# Patient Record
Sex: FEMALE | Race: WHITE | NOT HISPANIC OR LATINO | Employment: FULL TIME | URBAN - METROPOLITAN AREA
[De-identification: names, ages, dates, MRNs, and addresses within clinical notes are randomized per-mention and may not be internally consistent; named-entity substitution may affect disease eponyms.]

---

## 2016-05-31 LAB
EXTERNAL ABO GROUPING: NORMAL
EXTERNAL ANTIBODY SCREEN: NEGATIVE
EXTERNAL RH FACTOR: POSITIVE
EXTERNAL RUBELLA QUALITATIVE: NORMAL
EXTERNAL SYPHILIS RPR SCREEN: NORMAL
EXTERNAL URINE DRUG SCREEN: NORMAL

## 2016-12-13 LAB — EXTERNAL GROUP B STREP ANTIGEN: NEGATIVE

## 2017-01-02 ENCOUNTER — HOSPITAL ENCOUNTER (INPATIENT)
Dept: LABOR AND DELIVERY | Facility: HOSPITAL | Age: 32
LOS: 3 days | Discharge: HOME | End: 2017-01-05
Attending: OBSTETRICS & GYNECOLOGY | Admitting: OBSTETRICS & GYNECOLOGY

## 2017-01-02 DIAGNOSIS — Z3A.39 39 WEEKS GESTATION OF PREGNANCY: ICD-10-CM

## 2017-01-02 LAB
ABO GROUP BLD: NORMAL
BLD GP AB SCN SERPL QL: NEGATIVE
DEPRECATED RDW RBC AUTO: 42.5 FL (ref 37–54)
ERYTHROCYTE [DISTWIDTH] IN BLOOD BY AUTOMATED COUNT: 12.9 % (ref 11.3–14.5)
EXTERNAL HEMATOCRIT: 40 %
EXTERNAL HEMOGLOBIN: 13.5 G/DL
HCT VFR BLD AUTO: 39.7 % (ref 34.5–44)
HGB BLD-MCNC: 13.5 G/DL (ref 11.5–15.5)
MCH RBC QN AUTO: 30.8 PG (ref 27–31)
MCHC RBC AUTO-ENTMCNC: 34 G/DL (ref 32–36)
MCV RBC AUTO: 90.4 FL (ref 80–99)
PLATELET # BLD AUTO: 167 10*3/MM3 (ref 150–450)
PMV BLD AUTO: 11.7 FL (ref 6–12)
RBC # BLD AUTO: 4.39 10*6/MM3 (ref 3.89–5.14)
RH BLD: POSITIVE
WBC NRBC COR # BLD: 8.61 10*3/MM3 (ref 3.5–10.8)

## 2017-01-02 PROCEDURE — 86901 BLOOD TYPING SEROLOGIC RH(D): CPT

## 2017-01-02 PROCEDURE — 86900 BLOOD TYPING SEROLOGIC ABO: CPT

## 2017-01-02 PROCEDURE — 86850 RBC ANTIBODY SCREEN: CPT

## 2017-01-02 PROCEDURE — 59025 FETAL NON-STRESS TEST: CPT

## 2017-01-02 PROCEDURE — 59200 INSERT CERVICAL DILATOR: CPT | Performed by: OBSTETRICS & GYNECOLOGY

## 2017-01-02 PROCEDURE — 85027 COMPLETE CBC AUTOMATED: CPT | Performed by: OBSTETRICS & GYNECOLOGY

## 2017-01-02 RX ORDER — LIDOCAINE HYDROCHLORIDE 10 MG/ML
5 INJECTION, SOLUTION INFILTRATION; PERINEURAL AS NEEDED
Status: DISCONTINUED | OUTPATIENT
Start: 2017-01-02 | End: 2017-01-03 | Stop reason: HOSPADM

## 2017-01-02 RX ORDER — SODIUM CHLORIDE, SODIUM LACTATE, POTASSIUM CHLORIDE, CALCIUM CHLORIDE 600; 310; 30; 20 MG/100ML; MG/100ML; MG/100ML; MG/100ML
125 INJECTION, SOLUTION INTRAVENOUS CONTINUOUS
Status: DISCONTINUED | OUTPATIENT
Start: 2017-01-02 | End: 2017-01-03

## 2017-01-02 RX ORDER — FAMOTIDINE 20 MG/1
20 TABLET, FILM COATED ORAL 2 TIMES DAILY
Status: DISCONTINUED | OUTPATIENT
Start: 2017-01-02 | End: 2017-01-03 | Stop reason: HOSPADM

## 2017-01-02 RX ORDER — ZOLPIDEM TARTRATE 5 MG/1
5 TABLET ORAL NIGHTLY PRN
Status: DISCONTINUED | OUTPATIENT
Start: 2017-01-02 | End: 2017-01-03 | Stop reason: HOSPADM

## 2017-01-02 RX ORDER — ACETAMINOPHEN 325 MG/1
650 TABLET ORAL EVERY 4 HOURS PRN
Status: DISCONTINUED | OUTPATIENT
Start: 2017-01-02 | End: 2017-01-03 | Stop reason: HOSPADM

## 2017-01-02 RX ORDER — FAMOTIDINE 10 MG/ML
20 INJECTION, SOLUTION INTRAVENOUS 2 TIMES DAILY
Status: DISCONTINUED | OUTPATIENT
Start: 2017-01-02 | End: 2017-01-03 | Stop reason: HOSPADM

## 2017-01-02 RX ORDER — TERBUTALINE SULFATE 1 MG/ML
0.25 INJECTION, SOLUTION SUBCUTANEOUS AS NEEDED
Status: DISCONTINUED | OUTPATIENT
Start: 2017-01-02 | End: 2017-01-03 | Stop reason: HOSPADM

## 2017-01-02 RX ORDER — PROMETHAZINE HYDROCHLORIDE 12.5 MG/1
12.5 TABLET ORAL EVERY 6 HOURS PRN
Status: DISCONTINUED | OUTPATIENT
Start: 2017-01-02 | End: 2017-01-03 | Stop reason: HOSPADM

## 2017-01-02 RX ORDER — PROMETHAZINE HYDROCHLORIDE 25 MG/ML
12.5 INJECTION, SOLUTION INTRAMUSCULAR; INTRAVENOUS EVERY 6 HOURS PRN
Status: DISCONTINUED | OUTPATIENT
Start: 2017-01-02 | End: 2017-01-03 | Stop reason: HOSPADM

## 2017-01-02 RX ORDER — SODIUM CHLORIDE 0.9 % (FLUSH) 0.9 %
1-10 SYRINGE (ML) INJECTION AS NEEDED
Status: DISCONTINUED | OUTPATIENT
Start: 2017-01-02 | End: 2017-01-03 | Stop reason: HOSPADM

## 2017-01-02 RX ORDER — HEPARIN SODIUM 10000 [USP'U]/ML
10000 INJECTION, SOLUTION INTRAVENOUS; SUBCUTANEOUS 2 TIMES DAILY
Refills: 0 | COMMUNITY
Start: 2016-12-14 | End: 2017-01-05 | Stop reason: HOSPADM

## 2017-01-02 RX ORDER — OXYTOCIN/RINGER'S LACTATE 30/500 ML
2-24 PLASTIC BAG, INJECTION (ML) INTRAVENOUS
Status: DISCONTINUED | OUTPATIENT
Start: 2017-01-02 | End: 2017-01-02

## 2017-01-02 RX ORDER — OXYTOCIN/RINGER'S LACTATE 30/500 ML
2-24 PLASTIC BAG, INJECTION (ML) INTRAVENOUS
Status: DISCONTINUED | OUTPATIENT
Start: 2017-01-02 | End: 2017-01-03 | Stop reason: HOSPADM

## 2017-01-02 RX ORDER — PROMETHAZINE HYDROCHLORIDE 12.5 MG/1
12.5 SUPPOSITORY RECTAL EVERY 6 HOURS PRN
Status: DISCONTINUED | OUTPATIENT
Start: 2017-01-02 | End: 2017-01-03 | Stop reason: HOSPADM

## 2017-01-02 RX ADMIN — SODIUM CHLORIDE, POTASSIUM CHLORIDE, SODIUM LACTATE AND CALCIUM CHLORIDE 125 ML/HR: 600; 310; 30; 20 INJECTION, SOLUTION INTRAVENOUS at 18:38

## 2017-01-02 RX ADMIN — Medication 2 MILLI-UNITS/MIN: at 21:14

## 2017-01-02 NOTE — IP AVS SNAPSHOT
AFTER VISIT SUMMARY             Lauren Jones           About your hospitalization     You were admitted on:  January 2, 2017 You last received care in the:  [facility]       Procedures & Surgeries         Medications    If you or your caregiver advised us that you are currently taking a medication and that medication is marked below as “Resume”, this simply indicates that we have reviewed those medications to make sure our new therapy recommendations do not interfere.  If you have concerns about medications other than those new ones which we are prescribing today, please consult the physician who prescribed them (or your primary physician).  Our review of your home medications is not meant to indicate that we are directing their use.             Your Medications      START taking these medications     HYDROcodone-acetaminophen 5-325 MG per tablet   Take 2 tablets by mouth Every 4 (Four) Hours As Needed for moderate pain (4-6) for up to 9 days.   Last time this was given:  1/5/2017 12:37 AM   Commonly known as:  NORCO             CONTINUE taking these medications     docusate sodium 100 MG capsule   Take 200 mg by mouth 2 (two) times a day.   Last time this was given:  1/4/2017  6:45 PM   Commonly known as:  COLACE           enoxaparin 40 MG/0.4ML solution syringe   Inject 40 mg under the skin Every 12 (Twelve) Hours. Taking once daily.   Last time this was given:  1/4/2017  8:20 PM   Commonly known as:  LOVENOX           Magnesium 200 MG tablet   Take 1 tablet by mouth Daily.           polyethylene glycol packet   Take 17 g by mouth daily.   Commonly known as:  MIRALAX           prenatal (CLASSIC) vitamin 28-0.8 MG tablet tablet   Take  by mouth daily.             STOP taking these medications     heparin (porcine) 41717 UNIT/ML injection                Where to Get Your Medications      You can get these medications from any pharmacy     Bring a paper prescription for each of  these medications     HYDROcodone-acetaminophen 5-325 MG per tablet                  Your Medications      Your Medication List           Morning Noon Evening Bedtime As Needed    docusate sodium 100 MG capsule   Take 200 mg by mouth 2 (two) times a day.   Commonly known as:  COLACE                                enoxaparin 40 MG/0.4ML solution syringe   Inject 40 mg under the skin Every 12 (Twelve) Hours. Taking once daily.   Commonly known as:  LOVENOX                                HYDROcodone-acetaminophen 5-325 MG per tablet   Take 2 tablets by mouth Every 4 (Four) Hours As Needed for moderate pain (4-6) for up to 9 days.   Commonly known as:  NORCO                                Magnesium 200 MG tablet   Take 1 tablet by mouth Daily.                                polyethylene glycol packet   Take 17 g by mouth daily.   Commonly known as:  MIRALAX                                prenatal (CLASSIC) vitamin 28-0.8 MG tablet tablet   Take  by mouth daily.                                         Instructions for After Discharge         Follow-ups for After Discharge        Follow-up Information     Follow up with Vidhi Nicolas MD. Call today.    Specialties:  Obstetrics and Gynecology, Gynecology    Contact information:    [address] [address]  [phone]        [employer] Signup     [facility] [employer] allows you to send messages to your doctor, view your test results, renew your prescriptions, schedule appointments, and more. To sign up, go to Teamly and click on the Sign Up Now link in the New User? box. Enter your [employer] Activation Code exactly as it appears below along with the last four digits of your Social Security Number and your Date of Birth () to complete the sign-up process. If you do not sign up before the expiration date, you must request a new code.    [employer] Activation Code: [email or web address]  Expires: 2017  9:39 AM    If you have  questions, you can email [email or web address] or call [phone] to talk to our [employer] staff. Remember, [employer] is NOT to be used for urgent needs. For medical emergencies, dial 911.           Summary of Your Hospitalization        Reason for Hospitalization     Your primary diagnosis was:  Not on File    Your diagnoses also included:  Pregnancy, Pregnancy      Care Providers     Provider Service Role Specialty    Vidhi Nicolas MD -- Attending Provider Obstetrics and Gynecology      Your Allergies  Date Reviewed: 1/3/2017    No active allergies      Patient Belongings Returned     Document Return of Belongings Flowsheet     Were the patient bedside belongings sent home?   --   Belongings Retrieved from Security & Sent Home   --    Belongings Sent to Safe   --   Medications Retrieved from Pharmacy & Sent Home   --               SYMPTOMS OF A STROKE    Call 911 or have someone take you to the Emergency Department if you have any of the following:    · Sudden numbness or weakness of your face, arm or leg especially on one side of the body  · Sudden confusion, diffiiculty speaking or trouble understanding   · Changes in your vision or loss of sight in one eye  · Sudden severe headache with no known cause  · sudden dizziness, trouble walking, loss of balance or coordination    It is important to seek emergency care right away if you have further stroke symptoms. If you get emergency help quickly, the powerful clot-dissolving medicines can reduce the disabilities caused by a stroke.     For more information:    American Stroke Association  5-753-5-STROKE  www.strokeassociation.org           IF YOU SMOKE OR USE TOBACCO PLEASE READ THE FOLLOWING:    Why is smoking bad for me?  Smoking increases the risk of heart disease, lung disease, vascular disease, stroke, and cancer.     If you smoke, STOP!    If you would like more information on quitting smoking, please visit the [employer] website:  [email or web address]   This link will provide additional resources including the QUIT line and the Beat the Pack support groups.     For more information:    American Cancer Society  (676) 305-8768    American Heart Association  1-612.662.7981               YOU ARE THE MOST IMPORTANT FACTOR IN YOUR RECOVERY.     Follow all instructions carefully.     I have reviewed my discharge instructions with my nurse, including the following information, if applicable:     Information about my illness and diagnosis   Follow up appointments (including lab draws)   Wound Care   Equipment Needs   Medications (new and continuing) along with side effects   Preventative information such as vaccines and smoking cessations   Diet   Pain   I know when to contact my Doctor's office or seek emergency care      I want my nurse to describe the side effects of my medications: YES NO   If the answer is no, I understand the side effects of my medications: YES NO   My nurse described the side effects of my medications in a way that I could understand: YES NO   I have taken my personal belongings and my own medications with me at discharge: YES NO            I have received this information and my questions have been answered. I have discussed any concerns I see with this plan with the nurse or physician. I understand these instructions.    Signature of Patient or Responsible Person: _____________________________________    Date: _________________  Time: __________________    Signature of Healthcare Provider: _______________________________________  Date: _________________  Time: __________________

## 2017-01-02 NOTE — IP AVS SNAPSHOT
AFTER VISIT SUMMARY             Lauren Jones           About your hospitalization     You were discharged on:  January 5, 2017 You last received care in the:  [facility]     Unit phone number:  [phone]       Medications    If you or your caregiver advised us that you are currently taking a medication and that medication is marked below as “Resume”, this simply indicates that we have reviewed those medications to make sure our new therapy recommendations do not interfere.  If you have concerns about medications other than those new ones which we are prescribing today, please consult the physician who prescribed them (or your primary physician).  Our review of your home medications is not meant to indicate that we are directing their use.             Your Medications      Your Medication List           Morning Noon Evening Bedtime As Needed    docusate sodium 100 MG capsule   Take 200 mg by mouth 2 (two) times a day.   Commonly known as:  COLACE                                enoxaparin 40 MG/0.4ML solution syringe   Inject 40 mg under the skin Every 12 (Twelve) Hours. Taking once daily.   Commonly known as:  LOVENOX                                HYDROcodone-acetaminophen 5-325 MG per tablet   Take 2 tablets by mouth Every 4 (Four) Hours As Needed for moderate pain (4-6) for up to 9 days.   Commonly known as:  NORCO                                Magnesium 200 MG tablet   Take 1 tablet by mouth Daily.                                polyethylene glycol packet   Take 17 g by mouth daily.   Commonly known as:  MIRALAX                                prenatal (CLASSIC) vitamin 28-0.8 MG tablet tablet   Take  by mouth daily.                                         Instructions for After Discharge         Follow-ups for After Discharge        [facility] Signup Information     [facility] [employer] allows you to send messages to your doctor, view your test results, renew your  prescriptions, schedule appointments, and more. To sign up, go to [employer] and click on the Sign Up Now link in the New User? box. Enter your [employer] Activation Code exactly as it appears below along with the last four digits of your Social Security Number and your Date of Birth () to complete the sign-up process. If you do not sign up before the expiration date, you must request a new code.    [employer] Activation Code: [email or web address]  Expires: 2017  9:39 AM    If you have questions, you can email [email or web address] or call [phone] to talk to our [employer] staff. Remember, [employer] is NOT to be used for urgent needs. For medical emergencies, dial 911.           Summary of Your Hospitalization        Reason for Hospitalization     Your primary diagnosis was:  Not on File    Your diagnoses also included:  Pregnancy, Pregnancy      Care Providers     Provider Service Role Specialty    Vidhi Nicolas MD -- Attending Provider Obstetrics and Gynecology      Your Allergies  Date Reviewed: 1/3/2017    No active allergies         SYMPTOMS OF A STROKE    Call 911 or have someone take you to the Emergency Department if you have any of the following:    · Sudden numbness or weakness of your face, arm or leg especially on one side of the body  · Sudden confusion, diffiiculty speaking or trouble understanding   · Changes in your vision or loss of sight in one eye  · Sudden severe headache with no known cause  · sudden dizziness, trouble walking, loss of balance or coordination    It is important to seek emergency care right away if you have further stroke symptoms. If you get emergency help quickly, the powerful clot-dissolving medicines can reduce the disabilities caused by a stroke.     For more information:    American Stroke Association  0-119-6-STROKE  www.strokeassociation.org           IF YOU SMOKE OR USE TOBACCO PLEASE READ THE FOLLOWING:    Why is smoking bad for me?  Smoking  increases the risk of heart disease, lung disease, vascular disease, stroke, and cancer.     If you smoke, STOP!    If you would like more information on quitting smoking, please visit the [employer] website: [email or web address]-[email or web address]   This link will provide additional resources including the QUIT line and the Beat the Pack support groups.     For more information:    American Cancer Society  ([phone] 670-6869    American Heart Association  1-993.314.4676               YOU ARE THE MOST IMPORTANT FACTOR IN YOUR RECOVERY.     Follow all instructions carefully.     I have reviewed my discharge instructions with my nurse, including the following information, if applicable:     Information about my illness and diagnosis   Follow up appointments (including lab draws)   Wound Care   Equipment Needs   Medications (new and continuing) along with side effects   Preventative information such as vaccines and smoking cessations   Diet   Pain   I know when to contact my Doctor's office or seek emergency care      I want my nurse to describe the side effects of my medications: YES NO   If the answer is no, I understand the side effects of my medications: YES NO   My nurse described the side effects of my medications in a way that I could understand: YES NO   I have taken my personal belongings and my own medications with me at discharge: YES NO            Should a home visit be schedule with you:  a notification from your provider will be made prior to the visit.  If you have any questions or concerns about the visit, contact your provider.      I have received this information and my questions have been answered. I have discussed any concerns I see with this plan with the nurse or physician. I understand these instructions.    Signature of Patient or Responsible Person: _____________________________________    Date: _________________  Time: __________________    Signature of Healthcare  Provider: _______________________________________  Date: _________________  Time: __________________

## 2017-01-03 ENCOUNTER — ANESTHESIA (OUTPATIENT)
Dept: LABOR AND DELIVERY | Facility: HOSPITAL | Age: 32
End: 2017-01-03

## 2017-01-03 ENCOUNTER — ANESTHESIA EVENT (OUTPATIENT)
Dept: LABOR AND DELIVERY | Facility: HOSPITAL | Age: 32
End: 2017-01-03

## 2017-01-03 PROBLEM — Z34.90 CURRENTLY PREGNANT: Status: ACTIVE | Noted: 2017-01-03

## 2017-01-03 PROCEDURE — 25010000002 FENTANYL CITRATE (PF) 100 MCG/2ML SOLUTION: Performed by: ANESTHESIOLOGY

## 2017-01-03 PROCEDURE — 25010000002 ROPIVACAINE PER 1 MG: Performed by: ANESTHESIOLOGY

## 2017-01-03 PROCEDURE — 0KQM0ZZ REPAIR PERINEUM MUSCLE, OPEN APPROACH: ICD-10-PCS | Performed by: OBSTETRICS & GYNECOLOGY

## 2017-01-03 PROCEDURE — 25010000002 GENTAMICIN PER 80 MG

## 2017-01-03 PROCEDURE — 25010000002 ONDANSETRON PER 1 MG: Performed by: ANESTHESIOLOGY

## 2017-01-03 PROCEDURE — 25010000002 BUTORPHANOL PER 1 MG: Performed by: OBSTETRICS & GYNECOLOGY

## 2017-01-03 PROCEDURE — 25010000002 ENOXAPARIN PER 10 MG: Performed by: OBSTETRICS & GYNECOLOGY

## 2017-01-03 PROCEDURE — 59025 FETAL NON-STRESS TEST: CPT

## 2017-01-03 RX ORDER — METHYLERGONOVINE MALEATE 0.2 MG/ML
200 INJECTION INTRAVENOUS AS NEEDED
Status: DISCONTINUED | OUTPATIENT
Start: 2017-01-03 | End: 2017-01-03 | Stop reason: HOSPADM

## 2017-01-03 RX ORDER — FENTANYL CITRATE 50 UG/ML
INJECTION, SOLUTION INTRAMUSCULAR; INTRAVENOUS AS NEEDED
Status: DISCONTINUED | OUTPATIENT
Start: 2017-01-03 | End: 2017-01-17 | Stop reason: SURG

## 2017-01-03 RX ORDER — CARBOPROST TROMETHAMINE 250 UG/ML
250 INJECTION, SOLUTION INTRAMUSCULAR AS NEEDED
Status: DISCONTINUED | OUTPATIENT
Start: 2017-01-03 | End: 2017-01-03 | Stop reason: HOSPADM

## 2017-01-03 RX ORDER — PROMETHAZINE HYDROCHLORIDE 25 MG/ML
12.5 INJECTION, SOLUTION INTRAMUSCULAR; INTRAVENOUS EVERY 6 HOURS PRN
Status: DISCONTINUED | OUTPATIENT
Start: 2017-01-03 | End: 2017-01-05 | Stop reason: HOSPADM

## 2017-01-03 RX ORDER — OXYCODONE AND ACETAMINOPHEN 7.5; 325 MG/1; MG/1
2 TABLET ORAL EVERY 4 HOURS PRN
Status: DISCONTINUED | OUTPATIENT
Start: 2017-01-03 | End: 2017-01-03 | Stop reason: HOSPADM

## 2017-01-03 RX ORDER — LIDOCAINE HYDROCHLORIDE AND EPINEPHRINE 15; 5 MG/ML; UG/ML
INJECTION, SOLUTION EPIDURAL AS NEEDED
Status: DISCONTINUED | OUTPATIENT
Start: 2017-01-03 | End: 2017-01-17 | Stop reason: SURG

## 2017-01-03 RX ORDER — BISACODYL 10 MG
10 SUPPOSITORY, RECTAL RECTAL DAILY PRN
Status: DISCONTINUED | OUTPATIENT
Start: 2017-01-04 | End: 2017-01-05 | Stop reason: HOSPADM

## 2017-01-03 RX ORDER — OXYTOCIN/RINGER'S LACTATE 20/1000 ML
125 PLASTIC BAG, INJECTION (ML) INTRAVENOUS CONTINUOUS PRN
Status: DISCONTINUED | OUTPATIENT
Start: 2017-01-03 | End: 2017-01-05 | Stop reason: HOSPADM

## 2017-01-03 RX ORDER — MISOPROSTOL 200 UG/1
800 TABLET ORAL AS NEEDED
Status: DISCONTINUED | OUTPATIENT
Start: 2017-01-03 | End: 2017-01-03 | Stop reason: HOSPADM

## 2017-01-03 RX ORDER — SODIUM CHLORIDE 0.9 % (FLUSH) 0.9 %
1-10 SYRINGE (ML) INJECTION AS NEEDED
Status: DISCONTINUED | OUTPATIENT
Start: 2017-01-03 | End: 2017-01-05 | Stop reason: HOSPADM

## 2017-01-03 RX ORDER — ONDANSETRON 2 MG/ML
4 INJECTION INTRAMUSCULAR; INTRAVENOUS ONCE AS NEEDED
Status: COMPLETED | OUTPATIENT
Start: 2017-01-03 | End: 2017-01-03

## 2017-01-03 RX ORDER — ZOLPIDEM TARTRATE 5 MG/1
5 TABLET ORAL NIGHTLY PRN
Status: DISCONTINUED | OUTPATIENT
Start: 2017-01-03 | End: 2017-01-05 | Stop reason: HOSPADM

## 2017-01-03 RX ORDER — FAMOTIDINE 10 MG/ML
20 INJECTION, SOLUTION INTRAVENOUS ONCE AS NEEDED
Status: DISCONTINUED | OUTPATIENT
Start: 2017-01-03 | End: 2017-01-03 | Stop reason: HOSPADM

## 2017-01-03 RX ORDER — PROMETHAZINE HYDROCHLORIDE 12.5 MG/1
12.5 TABLET ORAL EVERY 6 HOURS PRN
Status: DISCONTINUED | OUTPATIENT
Start: 2017-01-03 | End: 2017-01-03 | Stop reason: HOSPADM

## 2017-01-03 RX ORDER — PROMETHAZINE HYDROCHLORIDE 25 MG/1
25 TABLET ORAL EVERY 6 HOURS PRN
Status: DISCONTINUED | OUTPATIENT
Start: 2017-01-03 | End: 2017-01-05 | Stop reason: HOSPADM

## 2017-01-03 RX ORDER — GENTAMICIN SULFATE 60 MG/50ML
2 INJECTION, SOLUTION INTRAVENOUS ONCE
Status: DISCONTINUED | OUTPATIENT
Start: 2017-01-03 | End: 2017-01-03

## 2017-01-03 RX ORDER — ACETAMINOPHEN 325 MG/1
650 TABLET ORAL EVERY 4 HOURS PRN
Status: DISCONTINUED | OUTPATIENT
Start: 2017-01-03 | End: 2017-01-05 | Stop reason: HOSPADM

## 2017-01-03 RX ORDER — OXYTOCIN/RINGER'S LACTATE 20/1000 ML
999 PLASTIC BAG, INJECTION (ML) INTRAVENOUS CONTINUOUS PRN
Status: DISCONTINUED | OUTPATIENT
Start: 2017-01-03 | End: 2017-01-03

## 2017-01-03 RX ORDER — LANOLIN 100 %
OINTMENT (GRAM) TOPICAL
Status: DISCONTINUED | OUTPATIENT
Start: 2017-01-03 | End: 2017-01-05 | Stop reason: HOSPADM

## 2017-01-03 RX ORDER — METOCLOPRAMIDE HYDROCHLORIDE 5 MG/ML
10 INJECTION INTRAMUSCULAR; INTRAVENOUS ONCE AS NEEDED
Status: DISCONTINUED | OUTPATIENT
Start: 2017-01-03 | End: 2017-01-03 | Stop reason: HOSPADM

## 2017-01-03 RX ORDER — PROMETHAZINE HYDROCHLORIDE 25 MG/ML
12.5 INJECTION, SOLUTION INTRAMUSCULAR; INTRAVENOUS EVERY 6 HOURS PRN
Status: DISCONTINUED | OUTPATIENT
Start: 2017-01-03 | End: 2017-01-03 | Stop reason: HOSPADM

## 2017-01-03 RX ORDER — ACETAMINOPHEN 500 MG
500 TABLET ORAL ONCE
Status: DISCONTINUED | OUTPATIENT
Start: 2017-01-03 | End: 2017-01-03

## 2017-01-03 RX ORDER — ACETAMINOPHEN 500 MG
1000 TABLET ORAL ONCE
Status: COMPLETED | OUTPATIENT
Start: 2017-01-03 | End: 2017-01-03

## 2017-01-03 RX ORDER — PROMETHAZINE HYDROCHLORIDE 12.5 MG/1
12.5 SUPPOSITORY RECTAL EVERY 6 HOURS PRN
Status: DISCONTINUED | OUTPATIENT
Start: 2017-01-03 | End: 2017-01-05 | Stop reason: HOSPADM

## 2017-01-03 RX ORDER — PROMETHAZINE HYDROCHLORIDE 12.5 MG/1
12.5 SUPPOSITORY RECTAL EVERY 6 HOURS PRN
Status: DISCONTINUED | OUTPATIENT
Start: 2017-01-03 | End: 2017-01-03 | Stop reason: HOSPADM

## 2017-01-03 RX ORDER — OXYCODONE HYDROCHLORIDE AND ACETAMINOPHEN 5; 325 MG/1; MG/1
1 TABLET ORAL EVERY 4 HOURS PRN
Status: DISCONTINUED | OUTPATIENT
Start: 2017-01-03 | End: 2017-01-03 | Stop reason: HOSPADM

## 2017-01-03 RX ORDER — DOCUSATE SODIUM 100 MG/1
100 CAPSULE, LIQUID FILLED ORAL 2 TIMES DAILY
Status: DISCONTINUED | OUTPATIENT
Start: 2017-01-03 | End: 2017-01-05 | Stop reason: HOSPADM

## 2017-01-03 RX ORDER — DIPHENHYDRAMINE HYDROCHLORIDE 50 MG/ML
12.5 INJECTION INTRAMUSCULAR; INTRAVENOUS EVERY 8 HOURS PRN
Status: DISCONTINUED | OUTPATIENT
Start: 2017-01-03 | End: 2017-01-03 | Stop reason: HOSPADM

## 2017-01-03 RX ORDER — ACETAMINOPHEN 325 MG/1
650 TABLET ORAL EVERY 4 HOURS PRN
Status: DISCONTINUED | OUTPATIENT
Start: 2017-01-03 | End: 2017-01-03 | Stop reason: HOSPADM

## 2017-01-03 RX ADMIN — ENOXAPARIN SODIUM 40 MG: 40 INJECTION SUBCUTANEOUS at 19:44

## 2017-01-03 RX ADMIN — ACETAMINOPHEN 650 MG: 325 TABLET, FILM COATED ORAL at 21:57

## 2017-01-03 RX ADMIN — SODIUM CHLORIDE, POTASSIUM CHLORIDE, SODIUM LACTATE AND CALCIUM CHLORIDE 125 ML/HR: 600; 310; 30; 20 INJECTION, SOLUTION INTRAVENOUS at 06:05

## 2017-01-03 RX ADMIN — GENTAMICIN SULFATE 110 MG: 40 INJECTION, SOLUTION INTRAMUSCULAR; INTRAVENOUS at 23:00

## 2017-01-03 RX ADMIN — ROPIVACAINE HYDROCHLORIDE 17 ML/HR: 5 INJECTION, SOLUTION EPIDURAL; INFILTRATION; PERINEURAL at 06:48

## 2017-01-03 RX ADMIN — FENTANYL CITRATE 100 MCG: 50 INJECTION, SOLUTION INTRAMUSCULAR; INTRAVENOUS at 06:48

## 2017-01-03 RX ADMIN — BUTORPHANOL TARTRATE 2 MG: 2 INJECTION, SOLUTION INTRAMUSCULAR; INTRAVENOUS at 04:26

## 2017-01-03 RX ADMIN — AMPICILLIN SODIUM 2 G: 2 INJECTION, POWDER, FOR SOLUTION INTRAMUSCULAR; INTRAVENOUS at 19:44

## 2017-01-03 RX ADMIN — WITCH HAZEL 1 PAD: 500 SOLUTION RECTAL; TOPICAL at 18:08

## 2017-01-03 RX ADMIN — SODIUM CHLORIDE, POTASSIUM CHLORIDE, SODIUM LACTATE AND CALCIUM CHLORIDE 125 ML/HR: 600; 310; 30; 20 INJECTION, SOLUTION INTRAVENOUS at 07:10

## 2017-01-03 RX ADMIN — SODIUM CHLORIDE, POTASSIUM CHLORIDE, SODIUM LACTATE AND CALCIUM CHLORIDE 125 ML/HR: 600; 310; 30; 20 INJECTION, SOLUTION INTRAVENOUS at 04:27

## 2017-01-03 RX ADMIN — GENTAMICIN SULFATE 140 MG: 40 INJECTION, SOLUTION INTRAMUSCULAR; INTRAVENOUS at 14:33

## 2017-01-03 RX ADMIN — Medication 125 ML/HR: at 14:30

## 2017-01-03 RX ADMIN — ROPIVACAINE HYDROCHLORIDE 10 ML: 5 INJECTION, SOLUTION EPIDURAL; INFILTRATION; PERINEURAL at 06:39

## 2017-01-03 RX ADMIN — SODIUM CHLORIDE, POTASSIUM CHLORIDE, SODIUM LACTATE AND CALCIUM CHLORIDE 125 ML/HR: 600; 310; 30; 20 INJECTION, SOLUTION INTRAVENOUS at 03:47

## 2017-01-03 RX ADMIN — ONDANSETRON 4 MG: 2 INJECTION INTRAMUSCULAR; INTRAVENOUS at 14:43

## 2017-01-03 RX ADMIN — ACETAMINOPHEN 1000 MG: 500 TABLET ORAL at 13:03

## 2017-01-03 RX ADMIN — LIDOCAINE HYDROCHLORIDE AND EPINEPHRINE 5 ML: 15; 5 INJECTION, SOLUTION EPIDURAL at 06:43

## 2017-01-03 RX ADMIN — DOCUSATE SODIUM 100 MG: 100 CAPSULE, LIQUID FILLED ORAL at 18:05

## 2017-01-03 RX ADMIN — ROPIVACAINE HYDROCHLORIDE 4 ML: 5 INJECTION, SOLUTION EPIDURAL; INFILTRATION; PERINEURAL at 06:44

## 2017-01-03 RX ADMIN — AMPICILLIN SODIUM 2 G: 2 INJECTION, POWDER, FOR SOLUTION INTRAMUSCULAR; INTRAVENOUS at 13:05

## 2017-01-03 RX ADMIN — Medication 999 ML/HR: at 13:29

## 2017-01-03 NOTE — PROCEDURES
Transcervical means placed per physician request.  FHT's class 1.  Patient tolerated well. 24 [place]raini, 60ml.    Santiago Begum MD  1/2/2017  8:37 PM

## 2017-01-03 NOTE — PLAN OF CARE
Problem: Patient Care Overview (Adult)  Goal: Plan of Care Review  Outcome: Ongoing (interventions implemented as appropriate)    01/03/17 0839   Coping/Psychosocial Response Interventions   Plan Of Care Reviewed With patient;spouse   Patient Care Overview   Progress progress toward functional goals as expected       Goal: Adult Individualization and Mutuality  Outcome: Ongoing (interventions implemented as appropriate)    01/03/17 0839   Individualization   Patient Specific Goals Have a healthy mother and baby       Goal: Discharge Needs Assessment  Outcome: Ongoing (interventions implemented as appropriate)    01/03/17 0839   Discharge Needs Assessment   Concerns To Be Addressed no discharge needs identified   Readmission Within The Last 30 Days no previous admission in last 30 days   Equipment Needed After Discharge none   Discharge Disposition home or self-care   Current Health   Anticipated Changes Related to Illness none   Self-Care   Equipment Currently Used at Home none   Living Environment   Transportation Available car         Problem: Labor (Cervical Ripen, Induct, Augment) (Adult,Obstetrics,Pediatric)  Intervention: Position/Reposition to Promote Fetal Well Being/Optimize Contraction Pattern    01/03/17 0839   Positioning   Positioning semi-Fowlers;side lying, left   Maternal/Fetal Interventions   Activity In Labor bedrest       Intervention: Assess for/Assist with Variations in Fetal Presentation    01/03/17 0839   Positioning   Positioning semi-Fowlers;side lying, left   Maternal/Fetal Interventions   Labor Interventions fetal heart rate monitored       Intervention: Monitor/Manage Labor Pain    01/03/17 0839   Manage Acute Burn Pain   Pain Management Interventions pain care plan reviewed with patient/caregiver;pillow support;medicated       Intervention: Provide Emotional Support and Encouragement    01/03/17 0839   Coping Strategies   Trust Relationship/Rapport care explained;choices  provided;emotional support provided;empathic listening provided;questions answered;questions encouraged;reassurance provided;thoughts/feelings acknowledged         Goal: Signs and Symptoms of Listed Potential Problems Will be Absent or Manageable (Labor)  Outcome: Ongoing (interventions implemented as appropriate)    01/03/17 0839   Labor (Cervical Ripen, Induct, Augment)   Problems Present (Labor) none

## 2017-01-03 NOTE — L&D DELIVERY NOTE
1/3/2017    Patient:Lauren Jones    MR#:[id number]    Vaginal Delivery Note  31 y.o. yo female  at 39w0d    Patient Active Problem List   Diagnosis   • Hx pulmonary embolism   • Pregnancy       Delivery     Delivery:       YOB: 2018    Time of Birth: 1:25 PM      Anesthesia: Epidural     Delivering clinician: Vidhi Nicolas    Forceps?   No   Vacuum? Yes  Vacuum Delivery  Vacuum attempted?       Vacuum indication:      Vacuum type:      Application location:      First Attempt     Time applied:      Time removed:      Second Attempt    Time applied:      Time removed:      Third Attempt    Time applied:      Time removed:      Number of pulls:      Number of pop-offs:      Low-end pressure range:      High-end pressure range:      Total application time:      Applied by:      Failed?          Shoulder dystocia present: No     Vacuum placed secondary to delayed second stage, maternal exhaustion, as well as maternal temperature and need for rapid delivery. Pulled with one contraction, 3 pulls, and baby was . Vacuum released, epis cut, one more pull and baby delivered. drt present.      Infant    Findings: female  infant     Infant observations: Weight: 7 lb 12.9 oz (3540 g)     Observations/Comments:         Apgars: 8   @ 1 minute /    9   @ 5 minutes         Placenta, Cord, and Fluid    Placenta delivered     at  1/3  1:29 PM     Cord:    present.   Nuchal Cord?  yes; Number of nuchal loops present: 1      Cord blood obtained:      Cord gases obtained:       Cord gas results: Pending         Repair    Episiotomy: Yes    Lacerations: Yes  Laceration Information  Laceration Repaired?   Perineal:      2nd degree   Periurethral:         Labial:         Sulcus:      bilateral   Vaginal:         Cervical:            Estimated Blood Loss:    400mls.   Suture used for repair: 2-0 Vicryl      Complications  maternal temperature    Disposition  Mother to Mother Baby/Postpartum  in stable  condition currently.  Baby to remains with mom  in stable condition currently.                Vidhi Nicolas MD  01/03/17  1:54 PM

## 2017-01-03 NOTE — ANESTHESIA PROCEDURE NOTES
Labor Epidural    Patient location during procedure: OB  Performed By  Anesthesiologist: SOWMYA RAMOS  Preanesthetic Checklist  Completed: patient identified, surgical consent, pre-op evaluation, timeout performed, IV checked, risks and benefits discussed and monitors and equipment checked  Epidural Block Prep:  Pt Position:sitting  Sterile Tech:cap, gloves, mask and sterile barrier  Monitoring:blood pressure monitoring  Epidural Block Procedure:  Approach:midline  Location:L3-L4  Needle Type:Tuohy  Needle Gauge:17 G  Cath Depth at skin:8 cm  Paresthesia: none  Aspiration:negative  Test Dose:negative  Post Assessment:  Dressing:occlusive dressing applied  Pt Tolerance:patient tolerated the procedure well with no apparent complications  Complications:no

## 2017-01-03 NOTE — PROGRESS NOTES
Patient pushing now approx hour and a half, temp 102.5. Will given po tylenol and start amp/gent.

## 2017-01-03 NOTE — ANESTHESIA PREPROCEDURE EVALUATION
Anesthesia Evaluation     Patient summary reviewed    No history of anesthetic complications   Airway   Mallampati: II  Neck ROM: full  anterior and no difficulty expected  Dental - normal exam     Pulmonary - normal exam   (+) pulmonary embolism,   (-) asthma, shortness of breath, not a smoker  Cardiovascular - negative cardio ROS and normal exam    Neuro/Psych  (+) headaches, psychiatric history,    (-) seizures, neuromuscular disease, TIA, CVA, dizziness/light headedness, syncope  GI/Hepatic/Renal/Endo    (+) obesity,  GERD poorly controlled, chronic renal disease,   (-) hepatitis, diabetes, hypothyroidism, hyperthyroidism    Musculoskeletal (-) negative ROS    Abdominal   (+) obese,    Substance History - negative use  (-) alcohol use, drug use     OB/GYN    (+) Pregnant,         Other   (+) blood dyscrasia                          Anesthesia Plan    ASA 3     epidural     Anesthetic plan and risks discussed with patient.

## 2017-01-03 NOTE — NURSING NOTE
Dr Hess updated on pt status.  Pt requested to be able to ambulate unmonitored in hallways, Stevie ok'd pt request.

## 2017-01-03 NOTE — H&P
History and Physical:    Subjective     Chief Complaint   Patient presents with   • Rupture of Membranes       Lauren Jones is a 31 y.o. year old  with an Estimated Date of Delivery: 1/10/17 currently at 38w6d presenting with leaking since 1500.    Prenatal care has been with Vidhi Nicolas MD.  It has been significant for h/o PE x2 on anticoagulation. .        Review of Systems  Pertinent items are noted in HPI.     Past Medical History   Diagnosis Date   • Pituitary adenoma      microadenoma for 10 yrs, followed by charles lyman   • Pulmonary embolism      following kidney stone retreival    • Recurrent kidney stones      Past Surgical History   Procedure Laterality Date   • Lasik     • Cystoscopy ureteroscopy stone manipulation/extraction     • [place] tooth extraction       Family History   Problem Relation Age of Onset   • Diabetes Maternal Aunt    • Diabetes Maternal Uncle      Social History   Substance Use Topics   • Smoking status: Former Smoker     Quit date: 2013   • Smokeless tobacco: None   • Alcohol use No     Prescriptions Prior to Admission   Medication Sig Dispense Refill Last Dose   • docusate sodium (COLACE) 100 MG capsule Take 200 mg by mouth 2 (two) times a day.   2017 at Unknown time   • Heparin Sodium, Porcine, (HEPARIN, PORCINE,) 79354 UNIT/ML injection Instill 10,000 Units into the bladder 2 (Two) Times a Day.  0 2017 at 0600   • Magnesium 200 MG tablet Take 1 tablet by mouth Daily.   2017 at Unknown time   • polyethylene glycol (MIRALAX) packet Take 17 g by mouth daily.   2017 at Unknown time   • Prenatal Vit-Fe Fumarate-FA (PRENATAL, CLASSIC, VITAMIN) 28-0.8 MG tablet tablet Take  by mouth daily.   2017 at Unknown time   • enoxaparin (LOVENOX) 40 MG/0.4ML solution syringe Inject 40 mg under the skin Every 12 (Twelve) Hours. Taking once daily.   Taking Differently     Allergies:  Review of patient's allergies indicates no known allergies.  OB History  "   Para Term  AB SAB TAB Ectopic Multiple Living   1 0 0 0 0 0 0 0 0 0      # Outcome Date GA Lbr Guido/2nd Weight Sex Delivery Anes PTL Lv   1 Current                     Objective     Visit Vitals   • /72 (BP Location: Right arm, Patient Position: Lying)   • Pulse 76   • Temp 97.9 °F (36.6 °C) (Oral)   • Resp 16   • Ht 66\" (167.6 cm)   • Wt 88.5 kg (195 lb)   • LMP 2016 (LMP Unknown)   • Breastfeeding Yes   • BMI 31.47 kg/m2       Physical Exam    General:  No acute distress           Abdomen: Gravid, nontender       FHT's: + accels, FHTs 130s    Cervix: 2/50/-2   [place]: Contraction are irregular     Lab Review   External Prenatal Results         Pregnancy Outside Results - these were transcribed from office records.  See scanned records for details. Date Time   Hgb ^ 13.5 g/dL 17    Hct ^ 40 % 17    ABO ^ AB  16    Rh ^ Positive  16    Antibody Screen ^ Negative  16    Glucose Fasting GTT      Glucose Tolerance Test 1 hour      Glucose Tolerance Test 3 hour      Gonorrhea (discrete)      Chlamydia (discrete)      RPR ^ Non-Reactive  16    VDRL      Syphillis Antibody      Rubella ^ Immune  16    HBsAg      Herpes Simplex Virus PCR      Herpes Simplex VIrus Culture      HIV      Hep C RNA Quant PCR      Hep C Antibody      Urine Drug Screen ^ Neg  16    AFP      Group B Strep ^ Negative  16    GBS Susceptibility to Clindamycin      GBS Susceptibility to Eythromycin      Fetal Fibronectin      Genetic Testing, Maternal Blood             Legend: ^: Historical              Assessment/Plan     ASSESSMENT  1. IUP at 38w6d  2. rupture of membranes   3. GBBSnegative    4. Last heparin dose at 6AM.  PLAN  1. Admit to labor and delivery   2.  pitocin and means bulb         Sanjuanita Hess MD  2017@  "

## 2017-01-03 NOTE — PROGRESS NOTES
Pharmacy to dose Gentamicin.    Patient concurrently on ampicillin  2g every 6 hours.     Will start Gentamicin synergy dosing for chorioamnionitis.      DBW= IBW + 0.4(TBW-IBW)= 71 kg     Plan  1. Gentamicin 2mg/kg load x 1 using DBW. Gentamicin 140 mg x 1   2. Start Gentamicin 1.5 mg/kg every 8 hours using DBW. Gentamicin 110 mg q8h  3. If continued for greater than 3 days will follow up with drug levels.   4. Pharmacy will continue to follow and reevaluate if there is a change in the patient's status.     Montana Hodges, PharmD  1/3/2017  1:35 PM

## 2017-01-04 LAB
BASOPHILS # BLD AUTO: 0.01 10*3/MM3 (ref 0–0.2)
BASOPHILS NFR BLD AUTO: 0 % (ref 0–1)
DEPRECATED RDW RBC AUTO: 44.6 FL (ref 37–54)
EOSINOPHIL # BLD AUTO: 0.03 10*3/MM3 (ref 0.1–0.3)
EOSINOPHIL NFR BLD AUTO: 0.1 % (ref 0–3)
ERYTHROCYTE [DISTWIDTH] IN BLOOD BY AUTOMATED COUNT: 13.5 % (ref 11.3–14.5)
HCT VFR BLD AUTO: 31.9 % (ref 34.5–44)
HGB BLD-MCNC: 11 G/DL (ref 11.5–15.5)
IMM GRANULOCYTES # BLD: 0.07 10*3/MM3 (ref 0–0.03)
IMM GRANULOCYTES NFR BLD: 0.3 % (ref 0–0.6)
LYMPHOCYTES # BLD AUTO: 1.76 10*3/MM3 (ref 0.6–4.8)
LYMPHOCYTES NFR BLD AUTO: 7.7 % (ref 24–44)
MCH RBC QN AUTO: 31.5 PG (ref 27–31)
MCHC RBC AUTO-ENTMCNC: 34.5 G/DL (ref 32–36)
MCV RBC AUTO: 91.4 FL (ref 80–99)
MONOCYTES # BLD AUTO: 1.64 10*3/MM3 (ref 0–1)
MONOCYTES NFR BLD AUTO: 7.2 % (ref 0–12)
NEUTROPHILS # BLD AUTO: 19.4 10*3/MM3 (ref 1.5–8.3)
NEUTROPHILS NFR BLD AUTO: 84.7 % (ref 41–71)
PLATELET # BLD AUTO: 169 10*3/MM3 (ref 150–450)
PMV BLD AUTO: 11.6 FL (ref 6–12)
RBC # BLD AUTO: 3.49 10*6/MM3 (ref 3.89–5.14)
WBC NRBC COR # BLD: 22.91 10*3/MM3 (ref 3.5–10.8)

## 2017-01-04 PROCEDURE — 25010000002 ENOXAPARIN PER 10 MG: Performed by: OBSTETRICS & GYNECOLOGY

## 2017-01-04 PROCEDURE — 85025 COMPLETE CBC W/AUTO DIFF WBC: CPT | Performed by: OBSTETRICS & GYNECOLOGY

## 2017-01-04 PROCEDURE — 25010000002 GENTAMICIN PER 80 MG

## 2017-01-04 RX ORDER — HYDROCODONE BITARTRATE AND ACETAMINOPHEN 5; 325 MG/1; MG/1
2 TABLET ORAL EVERY 4 HOURS PRN
Status: DISCONTINUED | OUTPATIENT
Start: 2017-01-04 | End: 2017-01-05 | Stop reason: HOSPADM

## 2017-01-04 RX ORDER — OXYCODONE HYDROCHLORIDE AND ACETAMINOPHEN 5; 325 MG/1; MG/1
2 TABLET ORAL EVERY 4 HOURS PRN
Status: DISCONTINUED | OUTPATIENT
Start: 2017-01-04 | End: 2017-01-05 | Stop reason: HOSPADM

## 2017-01-04 RX ADMIN — AMPICILLIN SODIUM 2 G: 2 INJECTION, POWDER, FOR SOLUTION INTRAMUSCULAR; INTRAVENOUS at 13:49

## 2017-01-04 RX ADMIN — DOCUSATE SODIUM 100 MG: 100 CAPSULE, LIQUID FILLED ORAL at 08:55

## 2017-01-04 RX ADMIN — MAGNESIUM HYDROXIDE 10 ML: 2400 SUSPENSION ORAL at 20:27

## 2017-01-04 RX ADMIN — DOCUSATE SODIUM 100 MG: 100 CAPSULE, LIQUID FILLED ORAL at 18:45

## 2017-01-04 RX ADMIN — OXYCODONE AND ACETAMINOPHEN 2 TABLET: 5; 325 TABLET ORAL at 15:27

## 2017-01-04 RX ADMIN — AMPICILLIN SODIUM 2 G: 2 INJECTION, POWDER, FOR SOLUTION INTRAMUSCULAR; INTRAVENOUS at 08:56

## 2017-01-04 RX ADMIN — OXYCODONE AND ACETAMINOPHEN 1 TABLET: 5; 325 TABLET ORAL at 05:15

## 2017-01-04 RX ADMIN — OXYCODONE AND ACETAMINOPHEN 2 TABLET: 5; 325 TABLET ORAL at 09:05

## 2017-01-04 RX ADMIN — OXYCODONE AND ACETAMINOPHEN 1 TABLET: 5; 325 TABLET ORAL at 00:15

## 2017-01-04 RX ADMIN — GENTAMICIN SULFATE 110 MG: 40 INJECTION, SOLUTION INTRAMUSCULAR; INTRAVENOUS at 14:42

## 2017-01-04 RX ADMIN — AMPICILLIN SODIUM 2 G: 2 INJECTION, POWDER, FOR SOLUTION INTRAMUSCULAR; INTRAVENOUS at 01:47

## 2017-01-04 RX ADMIN — ENOXAPARIN SODIUM 40 MG: 40 INJECTION SUBCUTANEOUS at 20:20

## 2017-01-04 RX ADMIN — GENTAMICIN SULFATE 110 MG: 40 INJECTION, SOLUTION INTRAMUSCULAR; INTRAVENOUS at 06:19

## 2017-01-04 RX ADMIN — OXYCODONE AND ACETAMINOPHEN 2 TABLET: 5; 325 TABLET ORAL at 15:28

## 2017-01-04 NOTE — LACTATION NOTE
"This note was copied from the chart of Vlad Jones.     01/04/17 1300   Maternal Infant Assessment   Nipple Conditions, Bilateral intact   Infant Assessment   Sucking Reflex present   Rooting Reflex present   Swallow Reflex present   LATCH Score   Latch 2-->grasps breast, tongue down, lips flanged, rhythmic sucking   Audible Swallowing 1-->a few with stimulation   Type Of Nipple 2-->everted (after stimulation)   Comfort (Breast/Nipple) 2-->soft/nontender   Hold (Positioning) 1-->minimal assist, teach one side: mother does other, staff holds   Score (less than 7 for 2/more consecutive times, consult Lactation Consultant) 8   Maternal Infant Feeding   Infant Positioning clutch/\"football\"   Latch Assistance yes   Feeding Infant   Feeding Readiness Cues rooting;crying   Effective Latch During Feeding yes   Audible Swallow yes   Suck/Swallow Coordination present   Skin-to-Skin Contact During Feeding yes     "

## 2017-01-04 NOTE — PLAN OF CARE
Problem: Breastfeeding (Adult,NICU,[place],Obstetrics,Pediatric)  Goal: Signs and Symptoms of Listed Potential Problems Will be Absent or Manageable (Breastfeeding)  Outcome: Ongoing (interventions implemented as appropriate)

## 2017-01-04 NOTE — PROGRESS NOTES
1/4/2017  PPD #1    Subjective   Lauren overall feels well.  Patient describes her lochia less than menses.  Pain is well controlled, however she states her hemorrhoids are sore.  Breastfeeding.       Objective   Temp: Temp:  [98.2 °F (36.8 °C)-102.6 °F (39.2 °C)] 98.6 °F (37 °C) Temp src: Oral   BP: BP: ()/() 111/53        Pulse: Heart Rate:  [] 95  RR: Resp:  [16-18] 16    General:  No acute distress   Abdomen: Fundus firm and beneath umbilicus   Pelvis: deferred     Lab Results   Component Value Date    WBC 22.91 (H) 01/04/2017    HGB 11.0 (L) 01/04/2017    HCT 31.9 (L) 01/04/2017    MCV 91.4 01/04/2017     01/04/2017       Assessment  1. PPD# 1 after vaginal delivery   2. Chorioamnionitis, on abx, currently afebrile  3. H/o PE on lovenox    Plan  1. Routine postpartum care.      This note has been electronically signed.    Margie Stephens, APRN  January 4, 2017

## 2017-01-04 NOTE — PLAN OF CARE
Problem: Patient Care Overview (Adult)  Goal: Plan of Care Review  Outcome: Ongoing (interventions implemented as appropriate)    Problem: Breastfeeding (Adult,NICU,[place],Obstetrics,Pediatric)  Goal: Signs and Symptoms of Listed Potential Problems Will be Absent or Manageable (Breastfeeding)  Outcome: Ongoing (interventions implemented as appropriate)

## 2017-01-04 NOTE — LACTATION NOTE
This note was copied from the chart of Vlad Jones.  Breastfeeding info given, teaching done.  Mom has breast pump at home.  Mom states baby nursed well just prior to my visit.  States she has some difficulty positioning due to mom and baby's IVs.  Positions taught to assist with latching baby.  Encouraged mom to take paci away from baby and nurse on demand.       01/03/17 1810   Maternal Information   Date of Referral 01/03/17   Person Making Referral (courtesy)   Maternal Reason for Referral (first time breastfeeding mom)   Maternal Infant Feeding   Previous Breastfeeding History no   Latch Assistance yes  (assisted by PCT at last feeding)   Current Delivery Breastfeeding History   Currently Breastfeeding no   Feeding Infant   Feeding Readiness Cues sucking motion present  (sucking hard on paci, encouraged feeding )   Equipment Type/Education   Breast Pump Type double electric, personal  (at home)

## 2017-01-05 VITALS
TEMPERATURE: 98.4 F | HEIGHT: 66 IN | SYSTOLIC BLOOD PRESSURE: 116 MMHG | BODY MASS INDEX: 31.34 KG/M2 | HEART RATE: 74 BPM | DIASTOLIC BLOOD PRESSURE: 58 MMHG | WEIGHT: 195 LBS | RESPIRATION RATE: 18 BRPM

## 2017-01-05 RX ORDER — HYDROCODONE BITARTRATE AND ACETAMINOPHEN 5; 325 MG/1; MG/1
2 TABLET ORAL EVERY 4 HOURS PRN
Qty: 24 TABLET | Refills: 0 | Status: SHIPPED | OUTPATIENT
Start: 2017-01-05 | End: 2017-01-14

## 2017-01-05 RX ADMIN — DOCUSATE SODIUM 100 MG: 100 CAPSULE, LIQUID FILLED ORAL at 11:58

## 2017-01-05 RX ADMIN — HYDROCODONE BITARTRATE AND ACETAMINOPHEN 2 TABLET: 5; 325 TABLET ORAL at 11:57

## 2017-01-05 RX ADMIN — HYDROCODONE BITARTRATE AND ACETAMINOPHEN 2 TABLET: 5; 325 TABLET ORAL at 00:37

## 2017-01-05 NOTE — DISCHARGE SUMMARY
Discharge Summary    Date of Admission: 2017  Date of Discharge:  2017      Patient: Lauren Jones      MR#:[id number]    Delivery Provider: Vidhi Nicolas     Discharge Surgeon/OB: eloisa    Presenting Problem/History of Present Illness  Pregnancy [Z33.1]  Currently pregnant [Z33.1]     Patient Active Problem List   Diagnosis   • Hx pulmonary embolism   • Pregnancy   • Currently pregnant         Discharge Diagnosis: Vaginal delivery at 39w0d    Procedures:  Vaginal, Vacuum (Extractor)     1/3/2017    1:25 PM        Discharge Date: 2017;     Hospital Course  Patient is a 31 y.o. female  at 39w0d status post vaginal delivery without complication. Postpartum the patient did well. She had a fever at delivery and was started on abx, but was afebrile shortly thereafter. She remained afebrile, with vital signs stable. She was ready for discharge on postpartum day 2.     Infant:   female  fetus 7 lb 12.9 oz (3.54 kg)  with Apgar scores of 8  , 9   at five minutes.    Condition on Discharge:  Stable    Vital Signs  Temp:  [98.3 °F (36.8 °C)-98.7 °F (37.1 °C)] 98.5 °F (36.9 °C)  Heart Rate:  [] 82  Resp:  [16-18] 16  BP: (110-114)/(57-59) 110/59    Lab Results   Component Value Date    WBC 22.91 (H) 2017    HGB 11.0 (L) 2017    HCT 31.9 (L) 2017    MCV 91.4 2017     2017       Discharge Disposition      Discharge Medications   Lauren Jones   Home Medication Instructions TANNER:[id number]    Printed on:17 0832   Medication Information                      docusate sodium (COLACE) 100 MG capsule  Take 200 mg by mouth 2 (two) times a day.             enoxaparin (LOVENOX) 40 MG/0.4ML solution syringe  Inject 40 mg under the skin Every 12 (Twelve) Hours. Taking once daily.             Heparin Sodium, Porcine, (HEPARIN, PORCINE,) 73843 UNIT/ML injection  Instill 10,000 Units into the bladder 2 (Two) Times a Day.             Magnesium 200 MG tablet  Take 1  tablet by mouth Daily.             polyethylene glycol (MIRALAX) packet  Take 17 g by mouth daily.             Prenatal Vit-Fe Fumarate-FA (PRENATAL, CLASSIC, VITAMIN) 28-0.8 MG tablet tablet  Take  by mouth daily.                 Discharge Diet:     Activity at Discharge:     Follow-up Appointments  No future appointments.       She has enough lovenox at home for 6 wks PP, she has FU with her PCP sched later this month.     Vidhi Nicolas MD  01/05/17  8:32 AM  [facility]

## 2017-01-05 NOTE — PLAN OF CARE
Problem: Postpartum, Vaginal Delivery (Adult)  Goal: Signs and Symptoms of Listed Potential Problems Will be Absent or Manageable (Postpartum, Vaginal Delivery)  Outcome: Ongoing (interventions implemented as appropriate)  Pain controlled well, no s\s of infection, light bleeding noted, voiding spontaneously, no distress noted this shift.

## 2020-03-05 ENCOUNTER — TELEPHONE (OUTPATIENT)
Dept: NEUROSURGERY | Facility: CLINIC | Age: 35
End: 2020-03-05

## 2020-03-05 NOTE — TELEPHONE ENCOUNTER
Pt called stated missed call from Kassi regarding scheduling. I advised would send encounter for call back.    [phone]

## 2022-07-18 ENCOUNTER — OFFICE VISIT (OUTPATIENT)
Dept: NEUROSURGERY | Facility: CLINIC | Age: 37
End: 2022-07-18

## 2022-07-18 VITALS
BODY MASS INDEX: 28.96 KG/M2 | DIASTOLIC BLOOD PRESSURE: 76 MMHG | HEIGHT: 66 IN | TEMPERATURE: 97.7 F | SYSTOLIC BLOOD PRESSURE: 118 MMHG | WEIGHT: 180.2 LBS

## 2022-07-18 DIAGNOSIS — G89.29 CHRONIC PAIN IN LEFT SHOULDER: ICD-10-CM

## 2022-07-18 DIAGNOSIS — M51.26 LUMBAR DISC HERNIATION: ICD-10-CM

## 2022-07-18 DIAGNOSIS — R20.2 NUMBNESS AND TINGLING IN LEFT ARM: ICD-10-CM

## 2022-07-18 DIAGNOSIS — M54.50 CHRONIC BILATERAL LOW BACK PAIN WITHOUT SCIATICA: Primary | ICD-10-CM

## 2022-07-18 DIAGNOSIS — G89.29 CHRONIC BILATERAL LOW BACK PAIN WITHOUT SCIATICA: Primary | ICD-10-CM

## 2022-07-18 DIAGNOSIS — M25.512 CHRONIC PAIN IN LEFT SHOULDER: ICD-10-CM

## 2022-07-18 DIAGNOSIS — R20.0 NUMBNESS AND TINGLING IN LEFT ARM: ICD-10-CM

## 2022-07-18 PROCEDURE — 99204 OFFICE O/P NEW MOD 45 MIN: CPT | Performed by: NEUROLOGICAL SURGERY

## 2022-07-18 RX ORDER — SPIRONOLACTONE 50 MG/1
TABLET, FILM COATED ORAL
COMMUNITY
Start: 2022-04-15

## 2022-07-18 RX ORDER — DOCUSATE SODIUM 100 MG/1
CAPSULE, LIQUID FILLED ORAL
COMMUNITY

## 2022-07-18 RX ORDER — ASCORBIC ACID 500 MG
TABLET ORAL
COMMUNITY

## 2022-07-18 RX ORDER — CHOLECALCIFEROL (VITAMIN D3) 50 MCG
TABLET ORAL
COMMUNITY

## 2022-07-18 RX ORDER — DIPHENOXYLATE HYDROCHLORIDE AND ATROPINE SULFATE 2.5; .025 MG/1; MG/1
TABLET ORAL
COMMUNITY

## 2022-07-18 RX ORDER — CYANOCOBALAMIN (VITAMIN B-12) 500 MCG
TABLET ORAL
COMMUNITY

## 2022-07-18 NOTE — PROGRESS NOTES
NAME: REMA CARRENO   DOS: 2022  : 1985  PCP: Veronica Ceja PA    Chief Complaint:    Chief Complaint   Patient presents with   • Back Pain   • Shoulder Pain       History of Present Illness:  37 y.o. 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  She presents with a history of some chronic axial back issues she has had increasing frequency of throwing her back out she denies any flagrant sciatica.  She stated that a lot of these issues began after the birth of her child about 5 years ago she has not been through any recent physical therapy she does not do any core strengthening etc. and she is here for evaluation    She also reports some numbness in her left hand its positional depending on where her left shoulder is she denies any medial scapular neck tenderness she is here for evaluation    PMHX  Allergies:  No Known Allergies  Medications    Current Outpatient Medications:   •  Ascorbic Acid (vitamin C with radha hips tablet) 500 MG tablet, , Disp: , Rfl:   •  Cholecalciferol (Vitamin D) 50 MCG (2000) tablet, , Disp: , Rfl:   •  Cyanocobalamin (Vitamin B 12) 500 MCG tablet, , Disp: , Rfl:   •  docusate sodium (COLACE) 100 MG capsule, , Disp: , Rfl:   •  Magnesium 200 MG tablet, Take 1 tablet by mouth Daily., Disp: , Rfl:   •  multivitamin (THERAGRAN) tablet tablet, , Disp: , Rfl:   •  spironolactone (ALDACTONE) 50 MG tablet, , Disp: , Rfl:   Past Medical History:  Past Medical History:   Diagnosis Date   • Arthritis     Per mri?   • Deep vein thrombosis (HCC)     Double pulmonary embolism in  I think   • Headache     Had migraines as a young adult   • Low back pain 2017    After birth if my daughter 2017   • Pituitary adenoma (HCC)     microadenoma for 10 yrs, followed by charles lyman   • Pulmonary embolism (HCC)     following kidney stone retreival    • Recurrent  kidney stones      Past Surgical History:  Past Surgical History:   Procedure Laterality Date   • CYSTOSCOPY URETEROSCOPY STONE MANIPULATION/EXTRACTION     • LASIK     • WISDOM TOOTH EXTRACTION       Social Hx:  Social History     Tobacco Use   • Smoking status: Former Smoker     Packs/day: 1.00     Years: 15.00     Pack years: 15.00     Types: Cigarettes     Quit date: 2013     Years since quittin.8   • Smokeless tobacco: Never Used   Vaping Use   • Vaping Use: Never used   Substance Use Topics   • Alcohol use: No   • Drug use: No     Family Hx:  Family History   Problem Relation Age of Onset   • Diabetes Maternal Aunt    • Diabetes Maternal Uncle    • Anxiety disorder Mother    • Hyperlipidemia Father      Review of Systems:        Review of Systems   Constitutional: Negative for activity change, appetite change, chills, diaphoresis, fatigue, fever and unexpected weight change.   HENT: Negative for congestion, dental problem, drooling, ear discharge, ear pain, facial swelling, hearing loss, mouth sores, nosebleeds, postnasal drip, rhinorrhea, sinus pressure, sinus pain, sneezing, sore throat, tinnitus, trouble swallowing and voice change.    Eyes: Negative for photophobia, pain, discharge, redness, itching and visual disturbance.   Respiratory: Negative for apnea, cough, choking, chest tightness, shortness of breath, wheezing and stridor.    Cardiovascular: Negative for chest pain, palpitations and leg swelling.   Gastrointestinal: Negative for abdominal distention, abdominal pain, anal bleeding, blood in stool, constipation, diarrhea, nausea, rectal pain and vomiting.   Endocrine: Negative for cold intolerance, heat intolerance, polydipsia, polyphagia and polyuria.   Genitourinary: Negative for decreased urine volume, difficulty urinating, dyspareunia, dysuria, enuresis, flank pain, frequency, genital sores, hematuria, menstrual problem, pelvic pain, urgency, vaginal bleeding, vaginal discharge and  vaginal pain.   Musculoskeletal: Positive for back pain and neck stiffness. Negative for arthralgias, gait problem, joint swelling, myalgias and neck pain.   Skin: Negative for color change, pallor, rash and wound.   Allergic/Immunologic: Negative for environmental allergies, food allergies and immunocompromised state.   Neurological: Positive for numbness. Negative for dizziness, tremors, seizures, syncope, facial asymmetry, speech difficulty, weakness, light-headedness and headaches.   Hematological: Negative for adenopathy. Does not bruise/bleed easily.   Psychiatric/Behavioral: Negative for agitation, behavioral problems, confusion, decreased concentration, dysphoric mood, hallucinations, self-injury, sleep disturbance and suicidal ideas. The patient is not nervous/anxious and is not hyperactive.       I have reviewed this note template and all pertinent parts of the review of systems social, family history, surgical history and medication list      Physical Examination:  Vitals:    07/18/22 1158   BP: 118/76   Temp: 97.7 °F (36.5 °C)      General Appearance:   Well developed, well nourished, well groomed, alert, and cooperative.  Neurological examination:  Neurologic Exam  Vital signs were reviewed and documented in the chart  Patient appeared in good neurologic function with normal comprehension fluent speech  Mood and affect are normal  Sense of smell deferred    COVID mass left in place  Muscle bulk and tone normal  5 out of 5 strength no motor drift  Gait normal intact  Negative Romberg  No clonus long tract signs or myelopathy  No signs of shoulder dysfunction  Reflexes symmetric trace  No edema noted and extremities skin appears normal  No Tinel's at the elbow mild arthritic symptoms at the wrist  Straight leg raise sign absent  No signs of intrinsic hip dysfunction  Back is without any lesions or abnormality  Feet are warm and well perfused  Strictly no signs of myelopathy found      Review of  Imaging/DATA:  Lumbar spinal MRI was personally reviewed she has a left L3-4 disc bulge at hypertense, L4-5 central disc bulge also noted no gross extravasation of disc spinal canal is widely patent  Diagnoses/Plan:    Ms. Jones is a 37 y.o. female   1.  Lumbar degenerative changes L3-4, L4-5.  These are episodic and consistent with repetitive use and posterior annular tear.  I talked about the the role of surgery and that surgery has no way of protecting or having a restorative effect against activities.  She has no evidence of neurogenic claudication she has no evidence of sciatica and if those were to develop are treatment strategy would change    2.  Left arm numbness-currently has trace reflexes no Mckeon's clonus long tract findings and I have no concerns for any sort of cervical dysfunction I did offer an MRI but given the static nature of the symptoms I suspect are more consistent with carpal tunnel.  Thorough work-up would include cervical Mri EMG nerve conduction study and we will defer that      Explain the chronic nature of pain and its onset I recommended  Physical therapy  Anti-inflammatory diets  Follow-up for discussions for potential referral to a rheumatoid specialist she does have some myofascial sensitization in her left back for example as well as other arthritic complaints  She also needs to form a relationship with a good interventional pain management specialist to manage her flareups    Also impressed upon her the need for patience during these flareups she currently is almost back to baseline from her most recent 1 but there clearly generating quite a bit of frustration    From a neurosurgical standpoint I be happy to see her back anytime in the future if her symptoms persist of left arm numbness I be happy to get an MRI EMG nerve conduction study and review them I would also be happy to see her back to discuss her low back if needed    Its been a pleasure to provide neurosurgical  care

## 2022-07-22 ENCOUNTER — PATIENT ROUNDING (BHMG ONLY) (OUTPATIENT)
Dept: NEUROSURGERY | Facility: CLINIC | Age: 37
End: 2022-07-22

## 2022-07-22 NOTE — PROGRESS NOTES
A MY-CHART MESSAGE HAS BEEN SENT TO THE PATIENT FOR PATIENT ROUNDING WITH [facility].

## 2023-01-12 ENCOUNTER — TELEPHONE (OUTPATIENT)
Dept: NEUROSURGERY | Facility: CLINIC | Age: 38
End: 2023-01-12
Payer: COMMERCIAL

## 2023-01-12 NOTE — TELEPHONE ENCOUNTER
Pt has an appointment with Dr. Pierce tomorrow in [place]. Per the PM notes, the pt has been having more arm pain. Per Dr. Pierce's last note, he mentioned getting an EMG test if she were to have more symptoms. I have contacted the pt, no answer. Left VM with the information above.     If she calls back and is indeed having more arm symptoms, we need to cancel her appointment and get a nerve study and have her follow up thereafter.

## 2023-01-13 ENCOUNTER — OFFICE VISIT (OUTPATIENT)
Dept: NEUROSURGERY | Facility: CLINIC | Age: 38
End: 2023-01-13
Payer: COMMERCIAL

## 2023-01-13 VITALS — HEIGHT: 66 IN | BODY MASS INDEX: 29.09 KG/M2 | RESPIRATION RATE: 16 BRPM | TEMPERATURE: 96 F | WEIGHT: 181 LBS

## 2023-01-13 DIAGNOSIS — G89.29 CHRONIC BILATERAL LOW BACK PAIN WITHOUT SCIATICA: Primary | ICD-10-CM

## 2023-01-13 DIAGNOSIS — M54.50 CHRONIC BILATERAL LOW BACK PAIN WITHOUT SCIATICA: Primary | ICD-10-CM

## 2023-01-13 PROCEDURE — 99214 OFFICE O/P EST MOD 30 MIN: CPT | Performed by: NEUROLOGICAL SURGERY

## 2023-01-13 NOTE — PROGRESS NOTES
NAME: REMA CARRENO   DOS: 2023  : 1985  PCP: Veronica Ceja PA    Chief Complaint:    Chief Complaint   Patient presents with   • Low back pain       History of Present Illness:  37 y.o. female   I saw this 37-year-old female in neurosurgical consultation she is well-known to me for history of lumbar spinal issues she is had a prior L4-5 disc that ruptured and healed as well as an L3-4 centralized disc herniation I am happy to report she is better    She had at the last visit that we evaluated her lumbar spine she was having some neck and right upper extremity symptoms some of them seem to quasiradicular in nature and given her lumbar spinal imaging could be related to a ruptured disc she also had some overtones of carpal tunnel and R ulnar neuropathy we deferred MRI imaging she had scheduled for report for follow-up after visitation with a pain management doctor and seems to be doing well however she does have intermittent occasional numbness and right-sided paraspinal pain today she is currently pain-free    She denies any progressive neurologic symptoms today she is here for evaluation    PMHX  Allergies:  No Known Allergies  Medications    Current Outpatient Medications:   •  Ascorbic Acid (vitamin C with radha hips tablet) 500 MG tablet, , Disp: , Rfl:   •  Cholecalciferol (Vitamin D) 50 MCG ( [place]) tablet, , Disp: , Rfl:   •  Cyanocobalamin (Vitamin B 12) 500 MCG tablet, , Disp: , Rfl:   •  docusate sodium (COLACE) 100 MG capsule, , Disp: , Rfl:   •  Magnesium 200 MG tablet, Take 1 tablet by mouth Daily., Disp: , Rfl:   •  multivitamin (THERAGRAN) tablet tablet, , Disp: , Rfl:   •  spironolactone (ALDACTONE) 50 MG tablet, , Disp: , Rfl:   Past Medical History:  Past Medical History:   Diagnosis Date   • Arthritis     Per mri?   • Deep vein thrombosis (HCC)     Double pulmonary embolism in  I think   • Headache     Had migraines as a young adult   • Low back pain  2017    After birth if my daughter 2017   • Pituitary adenoma (HCC)     microadenoma for 10 yrs, followed by charles lyman   • Pulmonary embolism (HCC)     following kidney stone retreival    • Recurrent kidney stones      Past Surgical History:  Past Surgical History:   Procedure Laterality Date   • CYSTOSCOPY URETEROSCOPY STONE MANIPULATION/EXTRACTION     • LASIK     • WISDOM TOOTH EXTRACTION       Social Hx:  Social History     Tobacco Use   • Smoking status: Former     Packs/day: 1.00     Years: 15.00     Pack years: 15.00     Types: Cigarettes     Quit date: 2013     Years since quittin.3   • Smokeless tobacco: Never   Vaping Use   • Vaping Use: Never used   Substance Use Topics   • Alcohol use: No   • Drug use: No     Family Hx:  Family History   Problem Relation Age of Onset   • Diabetes Maternal Aunt    • Diabetes Maternal Uncle    • Anxiety disorder Mother    • Hyperlipidemia Father      Review of Systems:        Review of Systems   Constitutional: Negative for activity change, appetite change, chills, diaphoresis, fatigue, fever and unexpected weight change.   HENT: Negative for congestion, dental problem, drooling, ear discharge, ear pain, facial swelling, hearing loss, mouth sores, nosebleeds, postnasal drip, rhinorrhea, sinus pressure, sneezing, sore throat, tinnitus, trouble swallowing and voice change.    Eyes: Negative for photophobia, pain, discharge, redness, itching and visual disturbance.   Respiratory: Negative for apnea, cough, choking, chest tightness, shortness of breath, wheezing and stridor.    Cardiovascular: Negative for chest pain, palpitations and leg swelling.   Gastrointestinal: Negative for abdominal distention, abdominal pain, anal bleeding, blood in stool, constipation, diarrhea, nausea, rectal pain and vomiting.   Endocrine: Negative for cold intolerance, heat intolerance, polydipsia, polyphagia and polyuria.   Genitourinary: Negative for decreased urine  volume, difficulty urinating, dysuria, enuresis, flank pain, frequency, genital sores, hematuria and urgency.   Musculoskeletal: Positive for arthralgias and myalgias. Negative for back pain, gait problem, joint swelling, neck pain and neck stiffness.   Skin: Negative for color change, pallor, rash and wound.   Allergic/Immunologic: Negative for environmental allergies, food allergies and immunocompromised state.   Neurological: Negative for dizziness, tremors, seizures, syncope, facial asymmetry, speech difficulty, weakness, light-headedness, numbness and headaches.   Hematological: Negative for adenopathy. Does not bruise/bleed easily.   Psychiatric/Behavioral: Negative for agitation, behavioral problems, confusion, decreased concentration, dysphoric mood, hallucinations, self-injury, sleep disturbance and suicidal ideas. The patient is not nervous/anxious and is not hyperactive.    All other systems reviewed and are negative.       I have reviewed this note template and all pertinent parts of the review of systems social, family history, surgical history and medication list  Physical Examination:  Vitals:    01/13/23 1129   Resp: 16   Temp: 96 °F (35.6 °C)      General Appearance:   Well developed, well nourished, well groomed, alert, and cooperative.  Neurological examination:  Neurologic Exam  She is wide awake alert follows commands    COVID mass left in place    No signs intrinsic shoulder dysfunction today    She is stronger in her upper and lower extremities    She can walk without assistance    She has no Mckeon's clonus long track signs and no evidence of hyperreflexia in the upper extremities    Review of Imaging/DATA:  I reviewed an MRI of the lumbar spine personally from her prior visits that shows the 3 4 centralize disc herniation that sizable as well as a L4-5 decreasing size L4-5 disc  Diagnoses/Plan:    Ms. Jones is a 37 y.o. female   1.  Status post lumbar multilevel degenerative disc L3-4  L4-5 with resolution of centralized disc herniations clinically improved    2.  Intermittent right upper extremity numbness periodic in nature currently today it is okay however she has had intermittent issues and requiring ongoing referral to pain management in the past    Plan will be she would like a second opinion from an additional pain management physician regarding treatment strategies we will make a referral to our group to see about that    Additionally I think it is very reasonable given the complex nature of her lumbar and spinal column degenerative disc changes frequency of disc herniations and quality life issues to set her up for an MRI of the cervical spine without contrast as well as EMG nerve conduction study to look for causes of numbness carpal tunnel etc. if her symptoms persist and/or return significantly    She is can see one of our interventional pain people to set up a consultation and we can review her MRIs at that time.

## 2023-01-13 NOTE — TELEPHONE ENCOUNTER
Patient called regarding her appointment this morning. She is not having arm pain at this time. She stopped doing most of the activities that were causing or aggravating the pain.    She is going to keep her appointment this morning just to touch base with Dr. Pierce. She stated if he wants to proceed with EMG, she would be up to having one. She also wants to discuss maybe changing pain management facilities. She's not had a good experience with the current provider/facility.

## 2023-10-30 ENCOUNTER — OFFICE VISIT (OUTPATIENT)
Dept: OBSTETRICS AND GYNECOLOGY | Facility: CLINIC | Age: 38
End: 2023-10-30
Payer: COMMERCIAL

## 2023-10-30 VITALS
DIASTOLIC BLOOD PRESSURE: 60 MMHG | SYSTOLIC BLOOD PRESSURE: 98 MMHG | BODY MASS INDEX: 23.56 KG/M2 | WEIGHT: 146.6 LBS | HEIGHT: 66 IN

## 2023-10-30 DIAGNOSIS — Z01.419 WOMEN'S ANNUAL ROUTINE GYNECOLOGICAL EXAMINATION: Primary | ICD-10-CM

## 2023-10-30 DIAGNOSIS — Z97.5 IUD (INTRAUTERINE DEVICE) IN PLACE: ICD-10-CM

## 2023-10-30 DIAGNOSIS — Z80.3 FAMILY HISTORY OF BREAST CANCER: ICD-10-CM

## 2023-10-30 PROBLEM — Z34.90 CURRENTLY PREGNANT: Status: RESOLVED | Noted: 2017-01-03 | Resolved: 2023-10-30

## 2023-10-30 PROCEDURE — 99385 PREV VISIT NEW AGE 18-39: CPT | Performed by: NURSE PRACTITIONER

## 2023-10-30 RX ORDER — COPPER 313.4 MG/1
INTRAUTERINE DEVICE INTRAUTERINE
COMMUNITY

## 2023-10-30 RX ORDER — SEMAGLUTIDE 1.34 MG/ML
INJECTION, SOLUTION SUBCUTANEOUS
COMMUNITY

## 2023-10-30 NOTE — PROGRESS NOTES
Gynecologic Annual Exam Note        Annual        Subjective     HPI  Lauren Jones is a 38 y.o.  female who presents for annual well woman exam as a previous patient not seen in the last three years. There were no changes to her medical or surgical history since her last visit.. Patient reports problems with:  breast changes since losing weight . Patient's last menstrual period was 10/17/2023 (approximate).. Her periods occur every 25-35 days , lasting 5-7 days. The flow is light to heavy. She reports dysmenorrhea is mild to moderate occurring the first 1-2 days. Partner Status: Marital Status: .  She is sexually active. She has not had new partners.. STD testing recommendations have been explained to the patient and she does not desire STD testing.    Is completing yearly mammograms with [facility].     Additional OB/GYN History   Current contraception: contraceptive methods: Paraguard inserted 3/14/2017  Desires to: continue contraception  Thromboembolic Disease: personal history-Double PE's    History of STD: no    Last Pap : 2020. Results: negative. HPV: not done.   Last Completed Pap Smear       This patient has no relevant Health Maintenance data.             History of abnormal Pap smear: no  Gardasil status:uncertain if she received the vaccine  Family history of uterine, colon, breast, or ovarian cancer: yes - Maternal Grandmother- Breast Cancer and several Maternal Great Aunts- Ovarian cancer  Performs monthly Self-Breast Exam: yes  Exercises Regularly:yes  Feelings of Anxiety or Depression: no  Tobacco Usage?: No       Current Outpatient Medications:     Ascorbic Acid (vitamin C with radha hips tablet) 500 MG tablet, , Disp: , Rfl:     Cholecalciferol (Vitamin D) 50 MCG (2000) tablet, , Disp: , Rfl:     Cyanocobalamin (Vitamin B 12) 500 MCG tablet, , Disp: , Rfl:     Magnesium 200 MG tablet, Take 1 tablet by mouth Daily., Disp: , Rfl:     multivitamin (THERAGRAN)  tablet tablet, , Disp: , Rfl:     Ozempic, 1 MG/DOSE, 4 MG/3ML solution pen-injector, as directed Subcutaneous once weekly for 30 days, Disp: , Rfl:     Paragard Intrauterine Copper intrauterine device IUD, as directed Intrauterine, Disp: , Rfl:     spironolactone (ALDACTONE) 50 MG tablet, , Disp: , Rfl:      Patient denies the need for medication refills today.    OB History          1    Para   1    Term   1       0    AB   0    Living   1         SAB   0    IAB   0    Ectopic   0    Molar        Multiple   0    Live Births   1                Health Maintenance   Topic Date Due    Annual Gynecologic Pelvic and Breast Exam  Never done    TDAP/TD VACCINES (1 - Tdap) Never done    HEPATITIS C SCREENING  Never done    ANNUAL PHYSICAL  Never done    PAP SMEAR  Never done    INFLUENZA VACCINE  2023    COVID-19 Vaccine (3 - 2023- season) 2023    LIPID PANEL  Never done    Pneumococcal Vaccine 0-64  Aged Out       Past Medical History:   Diagnosis Date    Arthritis     Per mri?    Deep vein thrombosis     Double pulmonary embolism in  I think    Headache     Had migraines as a young adult    Hyperlipidemia     Last physical    Low back pain 2017    After birth if my daughter 2017    Ovarian cyst     Pituitary adenoma     microadenoma for 10 yrs, followed by charles lyman    PMS (premenstrual syndrome)     Pulmonary embolism     following kidney stone retreival     Recurrent kidney stones     Urinary tract infection     Varicella         Past Surgical History:   Procedure Laterality Date    CYSTOSCOPY URETEROSCOPY STONE MANIPULATION/EXTRACTION      LASIK      WISDOM TOOTH EXTRACTION         The additional following portions of the patient's history were reviewed and updated as appropriate: allergies, current medications, past family history, past medical history, past social history, past surgical history, and problem list.    Review of Systems   Constitutional:  "Negative.    Respiratory: Negative.     Cardiovascular: Negative.    Gastrointestinal: Negative.    Genitourinary: Negative.          I have reviewed and agree with the HPI, ROS, and historical information as entered above. Nidhi Bird, APRN          Objective   BP 98/60   Ht 167.6 cm (66\")   Wt 66.5 kg (146 lb 9.6 oz)   LMP 10/17/2023 (Approximate)   Breastfeeding No   BMI 23.66 kg/m²     Physical Exam  Constitutional:       Appearance: Normal appearance.   Neck:      Thyroid: No thyroid mass or thyromegaly.   Pulmonary:      Effort: Pulmonary effort is normal.   Chest:      Chest wall: No mass.   Breasts:     Right: Normal. No inverted nipple, mass, nipple discharge or skin change.      Left: Normal. No inverted nipple, mass, nipple discharge or skin change.   Abdominal:      General: There is no distension.      Palpations: Abdomen is soft. There is no mass.      Tenderness: There is no abdominal tenderness.      Hernia: No hernia is present.   Genitourinary:     General: Normal vulva.      Labia:         Right: No rash.         Left: No rash.       Vagina: Normal.      Cervix: No cervical motion tenderness or lesion.      Uterus: Normal.       Adnexa: Right adnexa normal and left adnexa normal.        Right: No mass or tenderness.          Left: No mass or tenderness.        Comments: IUD strings visible. Approximately 1 inch in length  Neurological:      Mental Status: She is alert.            Assessment and Plan    Problem List Items Addressed This Visit          Family History    Family history of breast cancer    Overview     Maternal grandmother and maternal great aunt x 2 30's-40's. Does annual mammogram in [place]. She reports she had genetic testing which was negative.             Genitourinary and Reproductive     IUD (intrauterine device) in place    Overview     Paragard IUD placed 3/14/17          Other Visit Diagnoses       Women's annual routine gynecological examination    -  Primary "    Relevant Orders    LIQUID-BASED PAP SMEAR WITH HPV GENOTYPING REGARDLESS OF INTERPRETATION ([facility])            GYN annual well woman exam.   Reviewed pap guidelines.   Reviewed monthly self breast exams.  Instructed to call with lumps, pain, or breast discharge.    Return in about 1 year (around 10/30/2024) for Annual physical.      Nidhi Bird, APRN  10/30/2023

## 2023-11-03 LAB — REF LAB TEST METHOD: NORMAL

## 2024-11-04 ENCOUNTER — OFFICE VISIT (OUTPATIENT)
Dept: OBSTETRICS AND GYNECOLOGY | Facility: CLINIC | Age: 39
End: 2024-11-04
Payer: COMMERCIAL

## 2024-11-04 VITALS
WEIGHT: 158 LBS | SYSTOLIC BLOOD PRESSURE: 108 MMHG | DIASTOLIC BLOOD PRESSURE: 66 MMHG | BODY MASS INDEX: 25.39 KG/M2 | HEIGHT: 66 IN

## 2024-11-04 DIAGNOSIS — Z30.431 IUD CHECK UP: ICD-10-CM

## 2024-11-04 DIAGNOSIS — Z86.711 HX PULMONARY EMBOLISM: ICD-10-CM

## 2024-11-04 DIAGNOSIS — Z12.31 ENCOUNTER FOR SCREENING MAMMOGRAM FOR MALIGNANT NEOPLASM OF BREAST: ICD-10-CM

## 2024-11-04 DIAGNOSIS — Z80.3 FAMILY HISTORY OF BREAST CANCER: Primary | ICD-10-CM

## 2024-11-04 DIAGNOSIS — Z01.419 WOMEN'S ANNUAL ROUTINE GYNECOLOGICAL EXAMINATION: ICD-10-CM

## 2024-11-04 PROCEDURE — 99396 PREV VISIT EST AGE 40-64: CPT | Performed by: NURSE PRACTITIONER

## 2024-11-04 NOTE — PROGRESS NOTES
Gynecologic Annual Exam Note          GYN Annual Exam     Annual        Subjective     HPI  Lauren Jones is a 39 y.o. female, , who presents for annual well woman exam as a established patient. There were no changes to her medical or surgical history since her last visit..  Patient's last menstrual period was 10/23/2024 (approximate).  Her periods occur every 25-35 days, lasting 4-6 days.  The flow is light to heavy.  Heavy flow days are days 1-2. She reports dysmenorrhea is mild. Marital Status: . She is sexually active. She has not had new partners.. STD testing recommendations have been explained to the patient and she declines STD testing.    The patient would like to discuss the following complaints today: none    Additional OB/GYN History   contraceptive methods: Paraguard inserted 3/14/2017  Desires to: continue contraception  History of migraines: yes with aura    Last Pap : 10/30/2023. Result: negative. HPV: negative.   Last Completed Pap Smear            PAP SMEAR (Every 3 Years) Next due on 10/30/2026      10/30/2023  LIQUID-BASED PAP SMEAR WITH HPV GENOTYPING REGARDLESS OF INTERPRETATION ([facility])                  History of abnormal Pap smear: no  Family history of uterine, colon, breast, or ovarian cancer: yes - Maternal Grandmother- Breast Cancer and several Maternal Great Aunts- Ovarian cancer   Performs monthly Self-Breast Exam: yes  Last mammogram: 2024 Done at [facility]. There is not a copy in the chart.    Last Completed Mammogram       This patient has no relevant Health Maintenance data.            Colonoscopy: has had a colonoscopy several years ago  Exercises Regularly: yes  Feelings of Anxiety or Depression: no  Tobacco Usage?: No       Current Outpatient Medications:     Ascorbic Acid (vitamin C with radha hips tablet) 500 MG tablet, , Disp: , Rfl:     Cholecalciferol (Vitamin D) 50 MCG (2000) tablet, , Disp: , Rfl:     Cyanocobalamin (Vitamin B  12) 500 MCG tablet, , Disp: , Rfl:     Magnesium 200 MG tablet, Take 1 tablet by mouth Daily., Disp: , Rfl:     multivitamin (THERAGRAN) tablet tablet, , Disp: , Rfl:     Paragard Intrauterine Copper intrauterine device IUD, as directed Intrauterine, Disp: , Rfl:     spironolactone (ALDACTONE) 50 MG tablet, , Disp: , Rfl:      Patient denies the need for medication refills today.    OB History          1    Para   1    Term   1       0    AB   0    Living   1         SAB   0    IAB   0    Ectopic   0    Molar        Multiple   0    Live Births   1                Past Medical History:   Diagnosis Date    Arthritis     Per mri?    Deep vein thrombosis     Double pulmonary embolism in  I think    Headache     Had migraines as a young adult    Hyperlipidemia     Last physical    Low back pain     After birth if my daughter 2017    Ovarian cyst     Pituitary adenoma     microadenoma for 10 yrs, followed by charles lyman    PMS (premenstrual syndrome)     Pulmonary embolism     following kidney stone retreival 2006    Recurrent kidney stones     Urinary tract infection     Varicella         Past Surgical History:   Procedure Laterality Date    CYSTOSCOPY URETEROSCOPY STONE MANIPULATION/EXTRACTION      LASIK      WISDOM TOOTH EXTRACTION         Health Maintenance   Topic Date Due    LIPID PANEL  Never done    TDAP/TD VACCINES (1 - Tdap) Never done    HEPATITIS C SCREENING  Never done    ANNUAL PHYSICAL  Never done    INFLUENZA VACCINE  2024    COVID-19 Vaccine (3 - - season) 2024    BMI FOLLOWUP  10/30/2024    Annual Gynecologic Pelvic and Breast Exam  10/31/2024    MAMMOGRAM  2025    PAP SMEAR  10/30/2026    Pneumococcal Vaccine 0-64  Aged Out       The additional following portions of the patient's history were reviewed and updated as appropriate: allergies, current medications, past family history, past medical history, past social history, past  "surgical history, and problem list.    Review of Systems   Constitutional: Negative.    Respiratory: Negative.     Cardiovascular: Negative.    Gastrointestinal: Negative.    Genitourinary: Negative.          I have reviewed and agree with the HPI, ROS, and historical information as entered above. Nidhi Funkkman, APRN          Objective   /66   Ht 167.6 cm (66\")   Wt 71.7 kg (158 lb)   LMP 10/23/2024 (Approximate)   BMI 25.50 kg/m²     Physical Exam  Constitutional:       Appearance: Normal appearance.   Neck:      Thyroid: No thyroid mass or thyromegaly.   Pulmonary:      Effort: Pulmonary effort is normal.   Chest:      Chest wall: No mass.   Breasts:     Right: Normal. No inverted nipple, mass, nipple discharge or skin change.      Left: Normal. No inverted nipple, mass, nipple discharge or skin change.   Abdominal:      General: There is no distension.      Palpations: Abdomen is soft. There is no mass.      Tenderness: There is no abdominal tenderness.      Hernia: No hernia is present.   Genitourinary:     General: Normal vulva.      Labia:         Right: No rash.         Left: No rash.       Vagina: Normal.      Cervix: No cervical motion tenderness or lesion.      Uterus: Normal.       Adnexa: Right adnexa normal and left adnexa normal.        Right: No mass or tenderness.          Left: No mass or tenderness.        Comments: IUD strings visible and appropriate  Neurological:      Mental Status: She is alert.            Assessment and Plan    Problem List Items Addressed This Visit          Coag and Thromboembolic    Hx pulmonary embolism    Overview     Age 21 on SILVERIO, smoker, and 2 weeks following procedure to remove kidney stone. She saw Dr. Grace Nicolas who did additional blood work and cleared her from needing long term anti-coagulation.             Family History    Family history of breast cancer - Primary    Overview     Maternal grandmother and maternal great aunt x 2 30's-40's. Does " annual mammogram in [place]. She reports she had BRCA genetic testing which was negative. Plan to do more extended testing with [facility] at next mammo            Genitourinary and Reproductive     IUD check up    Overview     Paragard IUD placed 3/14/17. Plan mirena IUD when time for replacement.          Other Visit Diagnoses       Women's annual routine gynecological examination        Encounter for screening mammogram for malignant neoplasm of breast        Relevant Orders    Mammo Screening Digital Tomosynthesis Bilateral With CAD            GYN annual well woman exam.   Pap guidelines reviewed.  Reviewed monthly self breast exams.  Instructed to call with lumps, pain, or breast discharge.    Ordered Mammogram today  Return in about 1 year (around 11/4/2025) for Annual physical.     Nidhi Bird, APRN  11/04/2024

## 2024-12-11 ENCOUNTER — OFFICE VISIT (OUTPATIENT)
Dept: FAMILY MEDICINE CLINIC | Facility: CLINIC | Age: 39
End: 2024-12-11
Payer: COMMERCIAL

## 2024-12-11 VITALS
RESPIRATION RATE: 16 BRPM | SYSTOLIC BLOOD PRESSURE: 112 MMHG | WEIGHT: 159.2 LBS | HEIGHT: 66 IN | OXYGEN SATURATION: 99 % | DIASTOLIC BLOOD PRESSURE: 74 MMHG | HEART RATE: 78 BPM | TEMPERATURE: 98.4 F | BODY MASS INDEX: 25.58 KG/M2

## 2024-12-11 DIAGNOSIS — Z23 NEED FOR IMMUNIZATION AGAINST INFLUENZA: ICD-10-CM

## 2024-12-11 DIAGNOSIS — Z23 NEED FOR VACCINATION: ICD-10-CM

## 2024-12-11 DIAGNOSIS — D35.2 PITUITARY ADENOMA: ICD-10-CM

## 2024-12-11 DIAGNOSIS — Z13.1 SCREENING FOR DIABETES MELLITUS: ICD-10-CM

## 2024-12-11 DIAGNOSIS — Z00.00 GENERAL MEDICAL EXAM: Primary | ICD-10-CM

## 2024-12-11 DIAGNOSIS — F43.9 STRESS: ICD-10-CM

## 2024-12-11 DIAGNOSIS — Z11.59 NEED FOR HEPATITIS C SCREENING TEST: ICD-10-CM

## 2024-12-11 DIAGNOSIS — Z13.29 SCREENING FOR THYROID DISORDER: ICD-10-CM

## 2024-12-11 NOTE — LETTER
[facility]  Vaccine Consent Form    Patient Name:  Lauren Jones  Patient :  1985     Vaccine(s) Ordered    Fluzone >6mos ([id number])  Tdap Vaccine Greater Than or Equal To 8yo IM        Screening Checklist  The following questions should be completed prior to vaccination. If you answer “yes” to any question, it does not necessarily mean you should not be vaccinated. It just means we may need to clarify or ask more questions. If a question is unclear, please ask your healthcare provider to explain it.    Yes No   Any fever or moderate to severe illness today (mild illness and/or antibiotic treatment are not contraindications)?     Do you have a history of a serious reaction to any previous vaccinations, such as anaphylaxis, encephalopathy within 7 days, Guillain-Bentley syndrome within 6 weeks, seizure?     Have you received any live vaccine(s) (e.g MMR, DREAD) or any other vaccines in the last month (to ensure duplicate doses aren't given)?     Do you have an anaphylactic allergy to latex (DTaP, DTaP-IPV, Hep A, Hep B, MenB, RV, Td, Tdap), baker’s yeast (Hep B, HPV), polysorbates (RSV, nirsevimab, PCV 20, Rotavirrus, Tdap, Shingrix), or gelatin (DREAD, MMR)?     Do you have an anaphylactic allergy to neomycin (Rabies, DREAD, MMR, IPV, Hep A), polymyxin B (IPV), or streptomycin (IPV)?      Any cancer, leukemia, AIDS, or other immune system disorder? (DREAD, MMR, RV)     Do you have a parent, brother, or sister with an immune system problem (if immune competence of vaccine recipient clinically verified, can proceed)? (MMR, DREAD)     Any recent steroid treatments for >2 weeks, chemotherapy, or radiation treatment? (DREAD, MMR)     Have you received antibody-containing blood transfusions or IVIG in the past 11 months (recommended interval is dependent on product)? (MMR, DREAD)     Have you taken antiviral drugs (acyclovir, famciclovir, valacyclovir for DREAD) in the last 24 or 48 hours, respectively?      Are you  "pregnant or planning to become pregnant within 1 month? (DREAD, MMR, HPV, IPV, MenB, Abrexvy; For Hep B- refer to Engerix-B; For RSV - Abrysvo is indicated for 32-36 weeks of pregnancy from September to January)     For infants, have you ever been told your child has had intussusception or a medical emergency involving obstruction of the intestine (Rotavirus)? If not for an infant, can skip this question.         *Ordering Physicians/APC should be consulted if \"yes\" is checked by the patient or guardian above.  I have received, read, and understand the Vaccine Information Statement (VIS) for each vaccine ordered.  I have considered my or my child's health status as well as the health status of my close contacts.  I have taken the opportunity to discuss my vaccine questions with my or my child's health care provider.   I have requested that the ordered vaccine(s) be given to me or my child.  I understand the benefits and risks of the vaccines.  I understand that I should remain in the clinic for 15 minutes after receiving the vaccine(s).  _________________________________________________________  Signature of Patient or Parent/Legal Guardian ____________________  Date     "

## 2024-12-11 NOTE — PROGRESS NOTES
New Patient Office Visit      Date: 2024   Patient Name: Lauren Jones  : 1985   MRN: [id number]     Chief Complaint:    Chief Complaint   Patient presents with    Establish Care       Subjective    Patient or patient representative verbalized consent for the use of Ambient Listening during the visit with  Nga Rothman PA-C for chart documentation. 2024  09:35 EST      History of Present Illness: Lauren Jones is a 39 y.o. female who is here today to establish care.      Her previous provider, Dr. Gardner, has started only seeing patients over the age of 65. She has been under their care for several years, primarily for minor illnesses and annual physicals. She is due for a physical examination, having not had one in . She recently underwent a pelvic exam and is scheduled for her annual mammogram. She has no abdominal pain or changes in bowel movements.     She has been experiencing an increase in headaches over the past few weeks, which she attributes to work-related stress. Additionally, she has noticed intermittent eye twitching and tinnitus for the past 1.5 to 2 weeks. The tinnitus episodes are characterized by a temporary cessation of external noise, replaced by the ringing sound, lasting only a few seconds.     She typically has her vision checked annually and is due for her next appointment. She has observed a transient floater in her eye on two occasions in the past three weeks.    She was previously under the care of Dr. Aaliyah Walters at [facility] in [place] for weight management. She was on Wegovy or Ozempic but is currently not taking either medication. She has maintained her weight, although she reports feeling better when on the medication. She began seeing Dr. Walters over a year ago when she was 45 pounds heavier and had borderline high cholesterol. Her health has improved since then, aided by diet and exercise. She also experienced swelling in her  hands and feet, which resolved while on the medication. However, she has noticed a recurrence of foot swelling, which she attributes to a less stable diet during the holiday season. Despite maintaining her weight, she has noticed a slight increase. She has never had issues with high blood pressure, although she was once informed that her cholesterol levels were slightly elevated. She admits to poor hydration habits, rarely feeling thirsty unless consuming salty foods. She continues to see a health weight  through [facility] and aims to improve her liquid intake. Her activity level has remained relatively consistent, although slightly reduced due to cooler temperatures. She recalls a past borderline blood sugar issue but does not remember the specifics.     She has a small benign tumor on her pituitary gland, which has been monitored by an endocrinologist at [facility] for several years. She underwent extensive blood work and yearly scans, which were later reduced to every two years. She has not had a scan in recent years and has not seen Dr. Foreman, the endocrinologist, for some time. The tumor was discovered when she was a senior in high school and has not caused any issues or visual disturbances. There has been no growth in the tumor, even during regular monitoring, and it has shown little to no change in size.    She uses an IUD for birth control as hormonal-based methods were not recommended due to a history of double pulmonary embolisms (PEs) at age 20. She was initially thought to have a blood clotting disorder and was on a high dose of Coumadin for about 10 years. This was monitored from her early 20s until she decided to have a child. A genetic counselor later attributed the PEs to a tainted blood sample from the hospital. The PEs were believed to be caused by birth control and smoking, as she is a former smoker. She was undiagnosed from the protein S or C deficiency and has not been on blood thinners  since before her daughter's conception. She has not had any issues since then.    She has been struggling with focus and concentration, often waking up in the middle of the night thinking about work or tasks, and then being unable to fall back asleep. This occurs approximately once a week, increasing in frequency during busy periods. 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 She feels guilty about this lack of focus and believes it affects her ability to manage and communicate effectively. She has noticed an increase in these symptoms over the past year. She plans to keep a record of her sleep interruptions and other significant events to better understand their frequency and potential triggers.    Supplemental Information  She takes spironolactone for acne, which also helps with blood pressure. She has some disc degeneration that occasionally causes discomfort. She saw a neurologist and underwent a short course of pain management, but did not like taking pain medications. She also tried physical therapy, and the pain eventually resolved without medication.    SOCIAL HISTORY  The patient is a former smoker.    FAMILY HISTORY  The patient has an extensive family history of breast cancer.    Review of Systems:   Review of Systems   Constitutional:  Negative for activity change, appetite change, fatigue, fever, unexpected weight gain and unexpected weight loss.   HENT:  Positive for tinnitus. Negative for congestion, ear discharge, ear pain, postnasal drip, rhinorrhea, sinus pressure, sneezing, sore throat, trouble swallowing and voice change.    Eyes:  Negative for blurred vision, double vision, photophobia, pain, itching and visual disturbance.   Respiratory:  Negative for apnea, cough, chest tightness, shortness of breath and wheezing.    Cardiovascular:  Negative for chest pain, palpitations and leg swelling.    Gastrointestinal:  Negative for abdominal pain, blood in stool, constipation, diarrhea, nausea, vomiting and GERD.   Endocrine: Negative for cold intolerance, heat intolerance, polydipsia and polyuria.   Genitourinary:  Negative for decreased urine volume, difficulty urinating, dysuria, flank pain, frequency, hematuria and urinary incontinence.   Musculoskeletal:  Positive for arthralgias. Negative for back pain, gait problem, joint swelling and myalgias.   Skin:  Negative for rash, skin lesions and wound.   Allergic/Immunologic: Negative for environmental allergies and food allergies.   Neurological:  Positive for headache. Negative for dizziness, syncope, weakness, light-headedness and numbness.   Hematological:  Negative for adenopathy. Does not bruise/bleed easily.   Psychiatric/Behavioral:  Positive for stress. Negative for decreased concentration, dysphoric mood, sleep disturbance and depressed mood. The patient is not nervous/anxious.        Past Medical History:   Past Medical History:   Diagnosis Date    Arthritis 2022    Per mri?    Deep vein thrombosis 2005    Double pulmonary embolism in 2005 I think    Headache 2002    Had migraines as a young adult    Hyperlipidemia 2022    Last physical    Low back pain 2017    After birth if my daughter January 2017    Ovarian cyst     Pituitary adenoma     microadenoma for 10 yrs, followed by charles lyman    PMS (premenstrual syndrome)     Pulmonary embolism     following kidney stone retreival 2006    Recurrent kidney stones     Urinary tract infection     Varicella        Past Surgical History:   Past Surgical History:   Procedure Laterality Date    CYSTOSCOPY URETEROSCOPY STONE MANIPULATION/EXTRACTION      LASIK      WISDOM TOOTH EXTRACTION         Family History:   Family History   Problem Relation Age of Onset    Diabetes Maternal Aunt     Diabetes Maternal Uncle     Anxiety disorder Mother     Osteoporosis Mother     Hyperlipidemia Father     Breast cancer  "Maternal Grandmother     Cancer Maternal Grandmother         Breast       Social History:   Social History     Socioeconomic History    Marital status:    Tobacco Use    Smoking status: Former     Current packs/day: 0.00     Average packs/day: 1 pack/day for 15.0 years (15.0 ttl pk-yrs)     Types: Cigarettes     Start date: 1998     Quit date: 2013     Years since quittin.3    Smokeless tobacco: Never   Vaping Use    Vaping status: Never Used   Substance and Sexual Activity    Alcohol use: No    Drug use: No    Sexual activity: Yes     Partners: Male     Birth control/protection: I.U.D.     Comment: Paraguard inserted 3/14/2017       Medications:     Current Outpatient Medications:     Ascorbic Acid (vitamin C with radha hips tablet) 500 MG tablet, , Disp: , Rfl:     Cholecalciferol (Vitamin D) 50 MCG (2000 UT) tablet, , Disp: , Rfl:     Cyanocobalamin (Vitamin B 12) 500 MCG tablet, , Disp: , Rfl:     Magnesium 200 MG tablet, Take 1 tablet by mouth Daily., Disp: , Rfl:     Paragard Intrauterine Copper intrauterine device IUD, as directed Intrauterine, Disp: , Rfl:     spironolactone (ALDACTONE) 50 MG tablet, , Disp: , Rfl:     multivitamin (THERAGRAN) tablet tablet, , Disp: , Rfl:     Allergies:   No Known Allergies    Objective     Physical Exam:  Vital Signs:   Vitals:    24 0921   BP: 112/74   BP Location: Left arm   Patient Position: Sitting   Cuff Size: Adult   Pulse: 78   Resp: 16   Temp: 98.4 °F (36.9 °C)   TempSrc: Infrared   SpO2: 99%   Weight: 72.2 kg (159 lb 3.2 oz)   Height: 167.6 cm (66\")   PainSc: 0-No pain     Body mass index is 25.7 kg/m².       Physical Exam  Vitals and nursing note reviewed.   Constitutional:       General: She is not in acute distress.     Appearance: Normal appearance. She is not ill-appearing.   HENT:      Head: Normocephalic and atraumatic.      Right Ear: Ear canal and external ear normal.      Left Ear: Ear canal and external ear normal.      Ears:    "   Comments: Fluid behind TMs bilaterally     Nose: Nose normal.      Mouth/Throat:      Mouth: Mucous membranes are moist.      Pharynx: Oropharynx is clear. No oropharyngeal exudate or posterior oropharyngeal erythema.   Eyes:      Extraocular Movements: Extraocular movements intact.      Conjunctiva/sclera: Conjunctivae normal.      Pupils: Pupils are equal, round, and reactive to light.   Cardiovascular:      Rate and Rhythm: Normal rate and regular rhythm.      Heart sounds: Normal heart sounds.   Pulmonary:      Effort: Pulmonary effort is normal.      Breath sounds: Normal breath sounds.   Abdominal:      General: Bowel sounds are normal.      Palpations: Abdomen is soft. There is no mass.      Tenderness: There is no abdominal tenderness. There is no right CVA tenderness, left CVA tenderness or guarding.      Hernia: No hernia is present.   Musculoskeletal:      Cervical back: Normal range of motion and neck supple.      Right lower leg: No edema.      Left lower leg: No edema.   Lymphadenopathy:      Cervical: No cervical adenopathy.   Skin:     General: Skin is warm.   Neurological:      General: No focal deficit present.      Mental Status: She is alert and oriented to person, place, and time.      Motor: No weakness.      Coordination: Coordination normal.      Gait: Gait normal.   Psychiatric:         Mood and Affect: Mood normal.         Behavior: Behavior normal.         Results:   PHQ-2 Depression Screening  Little interest or pleasure in doing things? Not at all   Feeling down, depressed, or hopeless? Not at all   PHQ-2 Total Score 0           Labs:    No results found for this or any previous visit (from the past 24 hours).         Assessment / Plan      Assessment/Plan:     Diagnoses and all orders for this visit:    1. General medical exam (Primary)  Comments:  Benefits of immunizations and preventative screenings discussed with the patient and encouraged.  Orders:  -     HCV Antibody Rfx To Qnt  PCR; Future  -     Thyroid Cascade Profile; Future  -     Lipid Panel; Future  -     Hemoglobin A1c; Future  -     Comprehensive Metabolic Panel; Future  -     CBC & Differential  -     Comprehensive Metabolic Panel  -     Hemoglobin A1c  -     Lipid Panel  -     Thyroid Cascade Profile  -     HCV Antibody Rfx To Qnt PCR    2. Stress  Assessment & Plan:  Headaches, eye twitching, and tinnitus may be attributed to stress or potential allergies. Over-the-counter allergy medication or Flonase is recommended to ensure proper drainage. The use of blue light glasses was suggested for consideration during her upcoming eye exam.     Decreased concentration could be indicative of burnout and related stress and anxiety, which is increasingly common.  She was advised to engage in activities that she enjoys and to take mental breaks as much as possible. The potential benefits of yoga were discussed. She plans to keep a record of her sleep interruptions and other significant events to better understand their frequency and potential triggers.    We discussed the potential benefits of medication, and she agrees to return for discussion if symptoms begin interfering with her life.      3. Pituitary adenoma  Overview:  microadenoma for 10 yrs, followed by charles lyman    Assessment & Plan:  She has a history of a small benign tumor on her pituitary gland, which has been stable for years.      4. Need for immunization against influenza  -     Fluzone >6mos ([id number])    5. Screening for diabetes mellitus  -     Hemoglobin A1c; Future  -     Hemoglobin A1c    6. Screening for thyroid disorder  -     Thyroid Cascade Profile; Future  -     Thyroid Cascade Profile    7. Need for hepatitis C screening test  -     HCV Antibody Rfx To Qnt PCR; Future  -     HCV Antibody Rfx To Qnt PCR  -     Hepatitis C Virus Interpretation    8. Need for vaccination  -     Tdap Vaccine Greater Than or Equal To 6yo       Health maintenance  counseling:  Discussed injury prevention, diet and exercise, safe sexual practices, and screening for common diseases. Encouraged use of sunscreen and seatbelts. Encouraged SBE, avoidance of tobacco, limiting alcohol, and yearly dental and eye exams.        Follow Up:   Return in about 1 year (around 12/11/2025) for Annual Physical.    Nga Rothman PA-C  [facility]

## 2024-12-12 LAB
ALBUMIN SERPL-MCNC: 5.1 G/DL (ref 3.9–4.9)
ALP SERPL-CCNC: 48 IU/L (ref 44–121)
ALT SERPL-CCNC: 11 IU/L (ref 0–32)
AST SERPL-CCNC: 19 IU/L (ref 0–40)
BASOPHILS # BLD AUTO: 0 X10E3/UL (ref 0–0.2)
BASOPHILS NFR BLD AUTO: 1 %
BILIRUB SERPL-MCNC: 0.3 MG/DL (ref 0–1.2)
BUN SERPL-MCNC: 14 MG/DL (ref 6–24)
BUN/CREAT SERPL: 17 (ref 9–23)
CALCIUM SERPL-MCNC: 9.7 MG/DL (ref 8.7–10.2)
CHLORIDE SERPL-SCNC: 102 MMOL/L (ref 96–106)
CHOLEST SERPL-MCNC: 235 MG/DL (ref 100–199)
CO2 SERPL-SCNC: 23 MMOL/L (ref 20–29)
CREAT SERPL-MCNC: 0.83 MG/DL (ref 0.57–1)
EGFRCR SERPLBLD CKD-EPI 2021: 91 ML/MIN/1.73
EOSINOPHIL # BLD AUTO: 0.1 X10E3/UL (ref 0–0.4)
EOSINOPHIL NFR BLD AUTO: 1 %
ERYTHROCYTE [DISTWIDTH] IN BLOOD BY AUTOMATED COUNT: 11.8 % (ref 11.7–15.4)
GLOBULIN SER CALC-MCNC: 2.7 G/DL (ref 1.5–4.5)
GLUCOSE SERPL-MCNC: 81 MG/DL (ref 70–99)
HBA1C MFR BLD: 5.3 % (ref 4.8–5.6)
HCT VFR BLD AUTO: 42.4 % (ref 34–46.6)
HCV AB SERPL QL IA: NORMAL
HCV IGG SERPL QL IA: NON REACTIVE
HDLC SERPL-MCNC: 77 MG/DL
HGB BLD-MCNC: 14.2 G/DL (ref 11.1–15.9)
IMM GRANULOCYTES # BLD AUTO: 0 X10E3/UL (ref 0–0.1)
IMM GRANULOCYTES NFR BLD AUTO: 0 %
LDLC SERPL CALC-MCNC: 141 MG/DL (ref 0–99)
LYMPHOCYTES # BLD AUTO: 1.4 X10E3/UL (ref 0.7–3.1)
LYMPHOCYTES NFR BLD AUTO: 26 %
MCH RBC QN AUTO: 31.1 PG (ref 26.6–33)
MCHC RBC AUTO-ENTMCNC: 33.5 G/DL (ref 31.5–35.7)
MCV RBC AUTO: 93 FL (ref 79–97)
MONOCYTES # BLD AUTO: 0.4 X10E3/UL (ref 0.1–0.9)
MONOCYTES NFR BLD AUTO: 7 %
NEUTROPHILS # BLD AUTO: 3.4 X10E3/UL (ref 1.4–7)
NEUTROPHILS NFR BLD AUTO: 65 %
PLATELET # BLD AUTO: 279 X10E3/UL (ref 150–450)
POTASSIUM SERPL-SCNC: 4.3 MMOL/L (ref 3.5–5.2)
PROT SERPL-MCNC: 7.8 G/DL (ref 6–8.5)
RBC # BLD AUTO: 4.56 X10E6/UL (ref 3.77–5.28)
SODIUM SERPL-SCNC: 140 MMOL/L (ref 134–144)
TRIGL SERPL-MCNC: 101 MG/DL (ref 0–149)
TSH SERPL DL<=0.005 MIU/L-ACNC: 1.75 UIU/ML (ref 0.45–4.5)
VLDLC SERPL CALC-MCNC: 17 MG/DL (ref 5–40)
WBC # BLD AUTO: 5.3 X10E3/UL (ref 3.4–10.8)

## 2024-12-22 PROBLEM — F43.9 STRESS: Status: ACTIVE | Noted: 2024-12-22

## 2024-12-22 NOTE — ASSESSMENT & PLAN NOTE
She has a history of a small benign tumor on her pituitary gland, which has been stable for years.

## 2024-12-22 NOTE — ASSESSMENT & PLAN NOTE
Headaches, eye twitching, and tinnitus may be attributed to stress or potential allergies. Over-the-counter allergy medication or Flonase is recommended to ensure proper drainage. The use of blue light glasses was suggested for consideration during her upcoming eye exam.     Decreased concentration could be indicative of burnout and related stress and anxiety, which is increasingly common.  She was advised to engage in activities that she enjoys and to take mental breaks as much as possible. The potential benefits of yoga were discussed. She plans to keep a record of her sleep interruptions and other significant events to better understand their frequency and potential triggers.    We discussed the potential benefits of medication, and she agrees to return for discussion if symptoms begin interfering with her life.

## 2025-08-05 ENCOUNTER — OFFICE VISIT (OUTPATIENT)
Dept: FAMILY MEDICINE CLINIC | Facility: CLINIC | Age: 40
End: 2025-08-05
Payer: COMMERCIAL

## 2025-08-05 VITALS
WEIGHT: 172.5 LBS | DIASTOLIC BLOOD PRESSURE: 84 MMHG | OXYGEN SATURATION: 97 % | HEART RATE: 88 BPM | BODY MASS INDEX: 27.72 KG/M2 | SYSTOLIC BLOOD PRESSURE: 120 MMHG | HEIGHT: 66 IN

## 2025-08-05 DIAGNOSIS — G89.29 CHRONIC BILATERAL LOW BACK PAIN WITHOUT SCIATICA: Primary | ICD-10-CM

## 2025-08-05 DIAGNOSIS — M53.3 NONTRAUMATIC COCCYDYNIA: ICD-10-CM

## 2025-08-05 DIAGNOSIS — M54.50 CHRONIC BILATERAL LOW BACK PAIN WITHOUT SCIATICA: Primary | ICD-10-CM

## 2025-08-05 PROCEDURE — 99214 OFFICE O/P EST MOD 30 MIN: CPT | Performed by: PHYSICIAN ASSISTANT

## 2025-08-09 ENCOUNTER — PATIENT MESSAGE (OUTPATIENT)
Dept: FAMILY MEDICINE CLINIC | Facility: CLINIC | Age: 40
End: 2025-08-09
Payer: COMMERCIAL

## 2025-08-09 PROBLEM — M53.3 NONTRAUMATIC COCCYDYNIA: Status: ACTIVE | Noted: 2025-08-09

## 2025-08-09 PROBLEM — M54.50 CHRONIC BILATERAL LOW BACK PAIN WITHOUT SCIATICA: Status: ACTIVE | Noted: 2025-08-09

## 2025-08-09 PROBLEM — G89.29 CHRONIC BILATERAL LOW BACK PAIN WITHOUT SCIATICA: Status: ACTIVE | Noted: 2025-08-09

## 2025-08-11 ENCOUNTER — RESULTS FOLLOW-UP (OUTPATIENT)
Dept: FAMILY MEDICINE CLINIC | Facility: CLINIC | Age: 40
End: 2025-08-11
Payer: COMMERCIAL

## 2025-08-11 DIAGNOSIS — M54.50 CHRONIC BILATERAL LOW BACK PAIN WITHOUT SCIATICA: Primary | ICD-10-CM

## 2025-08-11 DIAGNOSIS — M53.3 NONTRAUMATIC COCCYDYNIA: ICD-10-CM

## 2025-08-11 DIAGNOSIS — G89.29 CHRONIC BILATERAL LOW BACK PAIN WITHOUT SCIATICA: Primary | ICD-10-CM

## 2025-08-22 ENCOUNTER — RESULTS FOLLOW-UP (OUTPATIENT)
Dept: FAMILY MEDICINE CLINIC | Facility: CLINIC | Age: 40
End: 2025-08-22
Payer: COMMERCIAL

## 2025-08-22 DIAGNOSIS — M53.3 NONTRAUMATIC COCCYDYNIA: ICD-10-CM

## 2025-08-22 DIAGNOSIS — M48.061 SPINAL STENOSIS OF LUMBAR REGION, UNSPECIFIED WHETHER NEUROGENIC CLAUDICATION PRESENT: Primary | ICD-10-CM
